# Patient Record
Sex: FEMALE | Race: BLACK OR AFRICAN AMERICAN | NOT HISPANIC OR LATINO | Employment: PART TIME | URBAN - METROPOLITAN AREA
[De-identification: names, ages, dates, MRNs, and addresses within clinical notes are randomized per-mention and may not be internally consistent; named-entity substitution may affect disease eponyms.]

---

## 2023-11-03 ENCOUNTER — APPOINTMENT (OUTPATIENT)
Dept: PRIMARY CARE | Facility: CLINIC | Age: 38
End: 2023-11-03
Payer: COMMERCIAL

## 2023-11-20 DIAGNOSIS — F32.A DEPRESSION, UNSPECIFIED DEPRESSION TYPE: Primary | ICD-10-CM

## 2023-11-20 RX ORDER — FLUOXETINE 20 MG/1
40 TABLET ORAL DAILY
Qty: 180 TABLET | Refills: 0 | Status: SHIPPED | OUTPATIENT
Start: 2023-11-20

## 2023-11-30 ENCOUNTER — LAB (OUTPATIENT)
Dept: LAB | Facility: LAB | Age: 38
End: 2023-11-30
Payer: COMMERCIAL

## 2023-11-30 DIAGNOSIS — Z11.59 ENCOUNTER FOR SCREENING FOR OTHER VIRAL DISEASES: ICD-10-CM

## 2023-11-30 DIAGNOSIS — E78.5 HYPERLIPIDEMIA, UNSPECIFIED: ICD-10-CM

## 2023-11-30 DIAGNOSIS — E11.9 TYPE 2 DIABETES MELLITUS WITHOUT COMPLICATIONS (MULTI): Primary | ICD-10-CM

## 2023-11-30 LAB
CHOLEST SERPL-MCNC: 227 MG/DL (ref 0–199)
CHOLESTEROL/HDL RATIO: 5.9
CREAT UR-MCNC: 357.4 MG/DL (ref 20–320)
EST. AVERAGE GLUCOSE BLD GHB EST-MCNC: 223 MG/DL
HBA1C MFR BLD: 9.4 %
HCV AB SER QL: NONREACTIVE
HDLC SERPL-MCNC: 38.2 MG/DL
LDLC SERPL CALC-MCNC: 126 MG/DL
MICROALBUMIN UR-MCNC: 700.1 MG/L
MICROALBUMIN/CREAT UR: 195.9 UG/MG CREAT
NON HDL CHOLESTEROL: 189 MG/DL (ref 0–149)
TRIGL SERPL-MCNC: 315 MG/DL (ref 0–149)
VLDL: 63 MG/DL (ref 0–40)

## 2023-11-30 PROCEDURE — 82570 ASSAY OF URINE CREATININE: CPT

## 2023-11-30 PROCEDURE — 80061 LIPID PANEL: CPT

## 2023-11-30 PROCEDURE — 36415 COLL VENOUS BLD VENIPUNCTURE: CPT

## 2023-11-30 PROCEDURE — 82043 UR ALBUMIN QUANTITATIVE: CPT

## 2023-11-30 PROCEDURE — 83036 HEMOGLOBIN GLYCOSYLATED A1C: CPT

## 2023-11-30 PROCEDURE — 86803 HEPATITIS C AB TEST: CPT

## 2024-01-26 ENCOUNTER — APPOINTMENT (OUTPATIENT)
Dept: RADIOLOGY | Facility: CLINIC | Age: 39
End: 2024-01-26
Payer: COMMERCIAL

## 2024-02-02 ENCOUNTER — HOSPITAL ENCOUNTER (OUTPATIENT)
Dept: RADIOLOGY | Facility: CLINIC | Age: 39
Discharge: HOME | End: 2024-02-02
Payer: COMMERCIAL

## 2024-02-02 DIAGNOSIS — E11.21 TYPE 2 DIABETES MELLITUS WITH DIABETIC NEPHROPATHY (MULTI): ICD-10-CM

## 2024-02-02 PROCEDURE — 76770 US EXAM ABDO BACK WALL COMP: CPT | Performed by: STUDENT IN AN ORGANIZED HEALTH CARE EDUCATION/TRAINING PROGRAM

## 2024-02-02 PROCEDURE — 76770 US EXAM ABDO BACK WALL COMP: CPT

## 2024-03-05 ENCOUNTER — HOSPITAL ENCOUNTER (OUTPATIENT)
Dept: RADIOLOGY | Facility: CLINIC | Age: 39
End: 2024-03-05
Payer: COMMERCIAL

## 2024-03-08 ENCOUNTER — HOSPITAL ENCOUNTER (OUTPATIENT)
Dept: RADIOLOGY | Facility: CLINIC | Age: 39
Discharge: HOME | End: 2024-03-08
Payer: COMMERCIAL

## 2024-03-08 ENCOUNTER — LAB (OUTPATIENT)
Dept: LAB | Facility: LAB | Age: 39
End: 2024-03-08
Payer: COMMERCIAL

## 2024-03-08 DIAGNOSIS — E04.9 NONTOXIC GOITER, UNSPECIFIED: ICD-10-CM

## 2024-03-08 DIAGNOSIS — R61 GENERALIZED HYPERHIDROSIS: ICD-10-CM

## 2024-03-08 DIAGNOSIS — E04.9 NONTOXIC GOITER, UNSPECIFIED: Primary | ICD-10-CM

## 2024-03-08 LAB
BASOPHILS # BLD AUTO: 0.13 X10*3/UL (ref 0–0.1)
BASOPHILS NFR BLD AUTO: 1 %
CRP SERPL-MCNC: 0.27 MG/DL
EOSINOPHIL # BLD AUTO: 0.41 X10*3/UL (ref 0–0.7)
EOSINOPHIL NFR BLD AUTO: 3.1 %
ERYTHROCYTE [DISTWIDTH] IN BLOOD BY AUTOMATED COUNT: 16.7 % (ref 11.5–14.5)
FSH SERPL-ACNC: 7.3 IU/L
HCT VFR BLD AUTO: 41.2 % (ref 36–46)
HGB BLD-MCNC: 13 G/DL (ref 12–16)
HIV 1+2 AB+HIV1 P24 AG SERPL QL IA: NONREACTIVE
IMM GRANULOCYTES # BLD AUTO: 0.16 X10*3/UL (ref 0–0.7)
IMM GRANULOCYTES NFR BLD AUTO: 1.2 % (ref 0–0.9)
LYMPHOCYTES # BLD AUTO: 2.63 X10*3/UL (ref 1.2–4.8)
LYMPHOCYTES NFR BLD AUTO: 19.9 %
MCH RBC QN AUTO: 22.7 PG (ref 26–34)
MCHC RBC AUTO-ENTMCNC: 31.6 G/DL (ref 32–36)
MCV RBC AUTO: 72 FL (ref 80–100)
MONOCYTES # BLD AUTO: 0.96 X10*3/UL (ref 0.1–1)
MONOCYTES NFR BLD AUTO: 7.3 %
NEUTROPHILS # BLD AUTO: 8.94 X10*3/UL (ref 1.2–7.7)
NEUTROPHILS NFR BLD AUTO: 67.5 %
NRBC BLD-RTO: 0 /100 WBCS (ref 0–0)
PLATELET # BLD AUTO: 340 X10*3/UL (ref 150–450)
RBC # BLD AUTO: 5.72 X10*6/UL (ref 4–5.2)
TSH SERPL-ACNC: 0.88 MIU/L (ref 0.44–3.98)
WBC # BLD AUTO: 13.2 X10*3/UL (ref 4.4–11.3)

## 2024-03-08 PROCEDURE — 71046 X-RAY EXAM CHEST 2 VIEWS: CPT

## 2024-03-08 PROCEDURE — 76536 US EXAM OF HEAD AND NECK: CPT | Performed by: RADIOLOGY

## 2024-03-08 PROCEDURE — 83001 ASSAY OF GONADOTROPIN (FSH): CPT | Performed by: FAMILY MEDICINE

## 2024-03-08 PROCEDURE — 86140 C-REACTIVE PROTEIN: CPT | Performed by: FAMILY MEDICINE

## 2024-03-08 PROCEDURE — 85025 COMPLETE CBC W/AUTO DIFF WBC: CPT | Performed by: FAMILY MEDICINE

## 2024-03-08 PROCEDURE — 76536 US EXAM OF HEAD AND NECK: CPT

## 2024-03-08 PROCEDURE — 86481 TB AG RESPONSE T-CELL SUSP: CPT

## 2024-03-08 PROCEDURE — 87389 HIV-1 AG W/HIV-1&-2 AB AG IA: CPT | Performed by: FAMILY MEDICINE

## 2024-03-08 PROCEDURE — 84443 ASSAY THYROID STIM HORMONE: CPT | Performed by: FAMILY MEDICINE

## 2024-03-08 PROCEDURE — 71046 X-RAY EXAM CHEST 2 VIEWS: CPT | Performed by: RADIOLOGY

## 2024-03-10 LAB
NIL(NEG) CONTROL SPOT COUNT: NORMAL
PANEL A SPOT COUNT: 3
PANEL B SPOT COUNT: 4
POS CONTROL SPOT COUNT: NORMAL
T-SPOT. TB INTERPRETATION: NEGATIVE

## 2024-03-21 ENCOUNTER — OFFICE VISIT (OUTPATIENT)
Dept: NEPHROLOGY | Facility: CLINIC | Age: 39
End: 2024-03-21
Payer: COMMERCIAL

## 2024-03-21 VITALS
RESPIRATION RATE: 16 BRPM | BODY MASS INDEX: 27.84 KG/M2 | HEIGHT: 65 IN | HEART RATE: 67 BPM | WEIGHT: 167.11 LBS | SYSTOLIC BLOOD PRESSURE: 118 MMHG | DIASTOLIC BLOOD PRESSURE: 74 MMHG | OXYGEN SATURATION: 99 %

## 2024-03-21 DIAGNOSIS — I10 BENIGN ESSENTIAL HTN: ICD-10-CM

## 2024-03-21 DIAGNOSIS — N18.1 CHRONIC KIDNEY DISEASE, STAGE I: Primary | ICD-10-CM

## 2024-03-21 PROCEDURE — 3074F SYST BP LT 130 MM HG: CPT | Performed by: INTERNAL MEDICINE

## 2024-03-21 PROCEDURE — 99203 OFFICE O/P NEW LOW 30 MIN: CPT | Performed by: INTERNAL MEDICINE

## 2024-03-21 PROCEDURE — 3078F DIAST BP <80 MM HG: CPT | Performed by: INTERNAL MEDICINE

## 2024-03-21 ASSESSMENT — PAIN SCALES - GENERAL: PAINLEVEL: 0-NO PAIN

## 2024-03-21 NOTE — PROGRESS NOTES
Nephrology Outpatient New Patient Visit Note    Chief Concern:  Felling unwell     History Of Present Illness  Ashley Chu is a 39 y.o. female with a history of  HTN, uncontrolled T2D after pregnancies in 2010 HbA1C >9, non compliance, sinusitis, presenting today because she fells weak, lack of energy and wondering if is related to her kidneys  - Saw a nephrologist at [place], who ordered a kidney US and did not hear anything back.   - 190 ACR, Scr 0.62 from Nov 2023   Normal kidney US   -She is on jardiance, and several BP meds that can't recall name, apparently not on RAASi     Past Medical History  She has a past medical history of Acute upper respiratory infection, unspecified (03/17/2016), Body mass index (BMI) 38.0-38.9, adult, Candidiasis, unspecified (04/25/2017), Cellulitis of right finger (02/05/2020), Diseases of the respiratory system complicating pregnancy, unspecified trimester (10/24/2022), Essential (primary) hypertension (08/21/2019), Ingrowing nail (02/05/2020), Localized edema (06/21/2016), Obesity complicating pregnancy, first trimester (11/28/2017), Other conditions influencing health status (02/08/2019), Personal history of gestational diabetes, Personal history of gestational diabetes (02/08/2019), Personal history of other diseases of the female genital tract (11/11/2015), Personal history of other diseases of the respiratory system (03/11/2016), Personal history of other diseases of the respiratory system (08/21/2019), Personal history of other diseases of urinary system (03/18/2014), Personal history of other specified conditions (11/11/2015), Personal history of other specified conditions (02/18/2014), Supervision of pregnancy with other poor reproductive or obstetric history, first trimester (10/24/2022), Unspecified maternal hypertension, first trimester (11/28/2017), and Urinary tract infection, site not specified (02/18/2014).  There is no problem list on file for this  "patient.      Surgical History  She has a past surgical history that includes Other surgical history (11/23/2020); Other surgical history (11/23/2020); and Other surgical history (11/23/2020).     Social History  She reports that she has been smoking cigarettes. She does not have any smokeless tobacco history on file. No history on file for alcohol use and drug use.    Family History  Family History   Problem Relation Name Age of Onset    Diabetes Mother      Diabetes Brother          Allergies  Patient has no known allergies.    Review of Systems  A 12-point review of systems was performed and noted be negative except for that which was mentioned in the history of present illness     Last Recorded Vitals  /74 (BP Location: Right arm, Patient Position: Sitting, BP Cuff Size: Adult long)   Pulse 67   Resp 16   Ht 1.651 m (5' 5\")   Wt 75.8 kg (167 lb 1.7 oz)   SpO2 99%   BMI 27.81 kg/m²      Physical Exam:  Constitutional:  No acute distress  Respiration:  Breathing comfortably, no stridor  Cardiovascular:  No clubbing/cyanosis/edema in hands  Eyes:  EOM intact, sclera normal  Neuro:  Alert and oriented times 3, Cranial nerves II-XII grossly intact and symmetric bilaterally. No asterixis  Head and Face:  Symmetric facial features, no masses or lesions, sinuses non-tender to palpation  Neck/Lymph:  No LAD, no thyroid masses, trachea midline  Skin:  skin is without visible rash  Psych:  Alert and oriented with appropriate mood and affect      Medications:  Medication Documentation Review Audit    **Prior to Admission medications have not yet been reviewed**         Relevant Results Recent labs reviewed in the EMR.  Lab Results   Component Value Date    HGB 13.0 03/08/2024    HGB 11.2 (L) 12/01/2022    HGB 13.7 05/20/2021    HGB 12.6 03/09/2020    MCV 72 (L) 03/08/2024    MCV 72 (L) 12/01/2022    MCV 74 (L) 05/20/2021    MCV 68 (L) 03/09/2020     03/08/2024     12/01/2022     05/20/2021 " "    03/09/2020       No results found for: \"FERRITIN\", \"IRON\"    Lab Results   Component Value Date     12/01/2022    K 4.2 12/01/2022     12/01/2022    BUN 13 12/01/2022    CREATININE 0.65 12/01/2022         Imaging:  I personally reviewed and this is my impression:        Assessment/Plan   1. Chronic kidney disease, stage I  - Normal Scr, albuminuria due to incipient diabetes damage but this would not explain any of there symptoms. She needs to be started on RAASi if not already on it and keep SGLT2i  - target SBP <120 -already there- and HbA1C <7  - I had a discussion about nature of kidney disease with uncontrolled DM and HTN and need to prevent any damage at this stage of disease  - No need to come here, she can get her care with PCP, come back prn     2. Benign essential HTN  - controlled     Paul Daniels MD  Division of Nephrology and Hypertension      "

## 2024-04-12 ENCOUNTER — LAB (OUTPATIENT)
Dept: LAB | Facility: LAB | Age: 39
End: 2024-04-12
Payer: COMMERCIAL

## 2024-04-12 DIAGNOSIS — D56.3 THALASSEMIA MINOR: ICD-10-CM

## 2024-04-12 DIAGNOSIS — E11.22 TYPE 2 DIABETES MELLITUS WITH DIABETIC CHRONIC KIDNEY DISEASE (MULTI): ICD-10-CM

## 2024-04-12 DIAGNOSIS — Z79.4 LONG TERM (CURRENT) USE OF INSULIN (MULTI): ICD-10-CM

## 2024-04-12 DIAGNOSIS — D72.829 ELEVATED WHITE BLOOD CELL COUNT, UNSPECIFIED: Primary | ICD-10-CM

## 2024-04-12 LAB
ALBUMIN SERPL BCP-MCNC: 4.5 G/DL (ref 3.4–5)
ALP SERPL-CCNC: 71 U/L (ref 33–110)
ALT SERPL W P-5'-P-CCNC: 19 U/L (ref 7–45)
ANION GAP SERPL CALC-SCNC: 11 MMOL/L (ref 10–20)
AST SERPL W P-5'-P-CCNC: 16 U/L (ref 9–39)
BASOPHILS # BLD AUTO: 0.08 X10*3/UL (ref 0–0.1)
BASOPHILS NFR BLD AUTO: 0.7 %
BILIRUB SERPL-MCNC: 0.6 MG/DL (ref 0–1.2)
BUN SERPL-MCNC: 12 MG/DL (ref 6–23)
CALCIUM SERPL-MCNC: 9.3 MG/DL (ref 8.6–10.3)
CHLORIDE SERPL-SCNC: 104 MMOL/L (ref 98–107)
CO2 SERPL-SCNC: 26 MMOL/L (ref 21–32)
CREAT SERPL-MCNC: 0.8 MG/DL (ref 0.5–1.05)
EGFRCR SERPLBLD CKD-EPI 2021: >90 ML/MIN/1.73M*2
EOSINOPHIL # BLD AUTO: 0.24 X10*3/UL (ref 0–0.7)
EOSINOPHIL NFR BLD AUTO: 2.2 %
ERYTHROCYTE [DISTWIDTH] IN BLOOD BY AUTOMATED COUNT: 15.9 % (ref 11.5–14.5)
EST. AVERAGE GLUCOSE BLD GHB EST-MCNC: 203 MG/DL
FERRITIN SERPL-MCNC: 50 NG/ML (ref 8–150)
GLUCOSE SERPL-MCNC: 187 MG/DL (ref 74–99)
HBA1C MFR BLD: 8.7 %
HCT VFR BLD AUTO: 38.5 % (ref 36–46)
HGB BLD-MCNC: 12.1 G/DL (ref 12–16)
IMM GRANULOCYTES # BLD AUTO: 0.05 X10*3/UL (ref 0–0.7)
IMM GRANULOCYTES NFR BLD AUTO: 0.5 % (ref 0–0.9)
IRON SATN MFR SERPL: 17 % (ref 25–45)
IRON SERPL-MCNC: 59 UG/DL (ref 35–150)
LYMPHOCYTES # BLD AUTO: 2.14 X10*3/UL (ref 1.2–4.8)
LYMPHOCYTES NFR BLD AUTO: 19.3 %
MCH RBC QN AUTO: 22.7 PG (ref 26–34)
MCHC RBC AUTO-ENTMCNC: 31.4 G/DL (ref 32–36)
MCV RBC AUTO: 72 FL (ref 80–100)
MONOCYTES # BLD AUTO: 0.82 X10*3/UL (ref 0.1–1)
MONOCYTES NFR BLD AUTO: 7.4 %
NEUTROPHILS # BLD AUTO: 7.73 X10*3/UL (ref 1.2–7.7)
NEUTROPHILS NFR BLD AUTO: 69.9 %
NRBC BLD-RTO: 0 /100 WBCS (ref 0–0)
PLATELET # BLD AUTO: 313 X10*3/UL (ref 150–450)
POTASSIUM SERPL-SCNC: 3.7 MMOL/L (ref 3.5–5.3)
PROT SERPL-MCNC: 7.2 G/DL (ref 6.4–8.2)
RBC # BLD AUTO: 5.32 X10*6/UL (ref 4–5.2)
SODIUM SERPL-SCNC: 137 MMOL/L (ref 136–145)
TIBC SERPL-MCNC: 349 UG/DL (ref 240–445)
UIBC SERPL-MCNC: 290 UG/DL (ref 110–370)
WBC # BLD AUTO: 11.1 X10*3/UL (ref 4.4–11.3)

## 2024-04-12 PROCEDURE — 36415 COLL VENOUS BLD VENIPUNCTURE: CPT

## 2024-04-12 PROCEDURE — 82728 ASSAY OF FERRITIN: CPT

## 2024-04-12 PROCEDURE — 83550 IRON BINDING TEST: CPT

## 2024-04-12 PROCEDURE — 83540 ASSAY OF IRON: CPT

## 2024-04-12 PROCEDURE — 83036 HEMOGLOBIN GLYCOSYLATED A1C: CPT

## 2024-04-12 PROCEDURE — 85025 COMPLETE CBC W/AUTO DIFF WBC: CPT

## 2024-04-12 PROCEDURE — 80053 COMPREHEN METABOLIC PANEL: CPT

## 2024-04-12 PROCEDURE — 85060 BLOOD SMEAR INTERPRETATION: CPT | Performed by: INTERNAL MEDICINE

## 2024-04-15 LAB — PATH REVIEW-CBC DIFFERENTIAL: NORMAL

## 2024-05-31 ENCOUNTER — APPOINTMENT (OUTPATIENT)
Dept: RADIOLOGY | Facility: HOSPITAL | Age: 39
End: 2024-05-31
Payer: COMMERCIAL

## 2024-05-31 ENCOUNTER — HOSPITAL ENCOUNTER (EMERGENCY)
Facility: HOSPITAL | Age: 39
Discharge: HOME | End: 2024-05-31
Attending: EMERGENCY MEDICINE
Payer: COMMERCIAL

## 2024-05-31 VITALS
SYSTOLIC BLOOD PRESSURE: 120 MMHG | HEART RATE: 71 BPM | TEMPERATURE: 98 F | DIASTOLIC BLOOD PRESSURE: 73 MMHG | HEIGHT: 65 IN | RESPIRATION RATE: 18 BRPM | WEIGHT: 137 LBS | BODY MASS INDEX: 22.82 KG/M2 | OXYGEN SATURATION: 98 %

## 2024-05-31 DIAGNOSIS — R10.9 ABDOMINAL PAIN, UNSPECIFIED ABDOMINAL LOCATION: Primary | ICD-10-CM

## 2024-05-31 LAB
ANION GAP SERPL CALC-SCNC: 13 MMOL/L (ref 10–20)
APPEARANCE UR: CLEAR
BASOPHILS # BLD AUTO: 0.11 X10*3/UL (ref 0–0.1)
BASOPHILS NFR BLD AUTO: 0.8 %
BILIRUB UR STRIP.AUTO-MCNC: NEGATIVE MG/DL
BUN SERPL-MCNC: 11 MG/DL (ref 6–23)
CALCIUM SERPL-MCNC: 9.2 MG/DL (ref 8.6–10.3)
CHLORIDE SERPL-SCNC: 103 MMOL/L (ref 98–107)
CO2 SERPL-SCNC: 23 MMOL/L (ref 21–32)
COLOR UR: ABNORMAL
CREAT SERPL-MCNC: 0.86 MG/DL (ref 0.5–1.05)
EGFRCR SERPLBLD CKD-EPI 2021: 89 ML/MIN/1.73M*2
EOSINOPHIL # BLD AUTO: 0.4 X10*3/UL (ref 0–0.7)
EOSINOPHIL NFR BLD AUTO: 2.8 %
ERYTHROCYTE [DISTWIDTH] IN BLOOD BY AUTOMATED COUNT: 17.2 % (ref 11.5–14.5)
GLUCOSE SERPL-MCNC: 267 MG/DL (ref 74–99)
GLUCOSE UR STRIP.AUTO-MCNC: ABNORMAL MG/DL
HCG UR QL IA.RAPID: NEGATIVE
HCT VFR BLD AUTO: 42.5 % (ref 36–46)
HGB BLD-MCNC: 14.1 G/DL (ref 12–16)
IMM GRANULOCYTES # BLD AUTO: 0.12 X10*3/UL (ref 0–0.7)
IMM GRANULOCYTES NFR BLD AUTO: 0.8 % (ref 0–0.9)
KETONES UR STRIP.AUTO-MCNC: NEGATIVE MG/DL
LACTATE SERPL-SCNC: 1.9 MMOL/L (ref 0.4–2)
LEUKOCYTE ESTERASE UR QL STRIP.AUTO: NEGATIVE
LYMPHOCYTES # BLD AUTO: 2.7 X10*3/UL (ref 1.2–4.8)
LYMPHOCYTES NFR BLD AUTO: 18.8 %
MCH RBC QN AUTO: 23.3 PG (ref 26–34)
MCHC RBC AUTO-ENTMCNC: 33.2 G/DL (ref 32–36)
MCV RBC AUTO: 70 FL (ref 80–100)
MONOCYTES # BLD AUTO: 1.06 X10*3/UL (ref 0.1–1)
MONOCYTES NFR BLD AUTO: 7.4 %
MUCOUS THREADS #/AREA URNS AUTO: ABNORMAL /LPF
NEUTROPHILS # BLD AUTO: 9.97 X10*3/UL (ref 1.2–7.7)
NEUTROPHILS NFR BLD AUTO: 69.4 %
NITRITE UR QL STRIP.AUTO: NEGATIVE
NRBC BLD-RTO: 0 /100 WBCS (ref 0–0)
PH UR STRIP.AUTO: 6.5 [PH]
PLATELET # BLD AUTO: 315 X10*3/UL (ref 150–450)
POTASSIUM SERPL-SCNC: 4 MMOL/L (ref 3.5–5.3)
PROT UR STRIP.AUTO-MCNC: NEGATIVE MG/DL
RBC # BLD AUTO: 6.06 X10*6/UL (ref 4–5.2)
RBC # UR STRIP.AUTO: ABNORMAL /UL
RBC #/AREA URNS AUTO: >20 /HPF
SODIUM SERPL-SCNC: 135 MMOL/L (ref 136–145)
SP GR UR STRIP.AUTO: 1.01
SQUAMOUS #/AREA URNS AUTO: ABNORMAL /HPF
UROBILINOGEN UR STRIP.AUTO-MCNC: NORMAL MG/DL
WBC # BLD AUTO: 14.4 X10*3/UL (ref 4.4–11.3)
WBC #/AREA URNS AUTO: ABNORMAL /HPF

## 2024-05-31 PROCEDURE — 74176 CT ABD & PELVIS W/O CONTRAST: CPT

## 2024-05-31 PROCEDURE — 81025 URINE PREGNANCY TEST: CPT | Performed by: NURSE PRACTITIONER

## 2024-05-31 PROCEDURE — 36415 COLL VENOUS BLD VENIPUNCTURE: CPT | Performed by: NURSE PRACTITIONER

## 2024-05-31 PROCEDURE — 96374 THER/PROPH/DIAG INJ IV PUSH: CPT

## 2024-05-31 PROCEDURE — 85025 COMPLETE CBC W/AUTO DIFF WBC: CPT | Performed by: NURSE PRACTITIONER

## 2024-05-31 PROCEDURE — 2500000004 HC RX 250 GENERAL PHARMACY W/ HCPCS (ALT 636 FOR OP/ED): Performed by: NURSE PRACTITIONER

## 2024-05-31 PROCEDURE — 96375 TX/PRO/DX INJ NEW DRUG ADDON: CPT

## 2024-05-31 PROCEDURE — 99284 EMERGENCY DEPT VISIT MOD MDM: CPT | Mod: 25

## 2024-05-31 PROCEDURE — 80048 BASIC METABOLIC PNL TOTAL CA: CPT | Performed by: NURSE PRACTITIONER

## 2024-05-31 PROCEDURE — 71046 X-RAY EXAM CHEST 2 VIEWS: CPT

## 2024-05-31 PROCEDURE — 83605 ASSAY OF LACTIC ACID: CPT | Performed by: NURSE PRACTITIONER

## 2024-05-31 PROCEDURE — 96361 HYDRATE IV INFUSION ADD-ON: CPT

## 2024-05-31 PROCEDURE — 81001 URINALYSIS AUTO W/SCOPE: CPT | Performed by: NURSE PRACTITIONER

## 2024-05-31 PROCEDURE — 74176 CT ABD & PELVIS W/O CONTRAST: CPT | Performed by: RADIOLOGY

## 2024-05-31 RX ORDER — ONDANSETRON HYDROCHLORIDE 2 MG/ML
4 INJECTION, SOLUTION INTRAVENOUS ONCE
Status: COMPLETED | OUTPATIENT
Start: 2024-05-31 | End: 2024-05-31

## 2024-05-31 RX ORDER — DICYCLOMINE HYDROCHLORIDE 10 MG/1
10 CAPSULE ORAL 4 TIMES DAILY
Qty: 17 CAPSULE | Refills: 0 | Status: SHIPPED | OUTPATIENT
Start: 2024-05-31

## 2024-05-31 RX ORDER — KETOROLAC TROMETHAMINE 30 MG/ML
30 INJECTION, SOLUTION INTRAMUSCULAR; INTRAVENOUS ONCE
Status: COMPLETED | OUTPATIENT
Start: 2024-05-31 | End: 2024-05-31

## 2024-05-31 RX ORDER — ONDANSETRON 4 MG/1
4 TABLET, FILM COATED ORAL 3 TIMES DAILY
Qty: 10 TABLET | Refills: 0 | Status: SHIPPED | OUTPATIENT
Start: 2024-05-31

## 2024-05-31 RX ORDER — DICYCLOMINE HYDROCHLORIDE 10 MG/1
10 CAPSULE ORAL 4 TIMES DAILY
Qty: 17 CAPSULE | Refills: 0 | Status: SHIPPED | OUTPATIENT
Start: 2024-05-31 | End: 2024-05-31

## 2024-05-31 RX ORDER — ONDANSETRON 4 MG/1
4 TABLET, FILM COATED ORAL 3 TIMES DAILY
Qty: 10 TABLET | Refills: 0 | Status: SHIPPED | OUTPATIENT
Start: 2024-05-31 | End: 2024-05-31

## 2024-05-31 RX ADMIN — SODIUM CHLORIDE 1000 ML: 9 INJECTION, SOLUTION INTRAVENOUS at 10:12

## 2024-05-31 RX ADMIN — KETOROLAC TROMETHAMINE 30 MG: 30 INJECTION, SOLUTION INTRAMUSCULAR at 10:44

## 2024-05-31 RX ADMIN — ONDANSETRON 4 MG: 2 INJECTION INTRAMUSCULAR; INTRAVENOUS at 10:11

## 2024-05-31 ASSESSMENT — LIFESTYLE VARIABLES
EVER HAD A DRINK FIRST THING IN THE MORNING TO STEADY YOUR NERVES TO GET RID OF A HANGOVER: NO
HAVE YOU EVER FELT YOU SHOULD CUT DOWN ON YOUR DRINKING: NO
EVER FELT BAD OR GUILTY ABOUT YOUR DRINKING: NO
TOTAL SCORE: 0
HAVE PEOPLE ANNOYED YOU BY CRITICIZING YOUR DRINKING: NO

## 2024-05-31 ASSESSMENT — PAIN DESCRIPTION - DESCRIPTORS: DESCRIPTORS: PRESSURE

## 2024-05-31 ASSESSMENT — COLUMBIA-SUICIDE SEVERITY RATING SCALE - C-SSRS
1. IN THE PAST MONTH, HAVE YOU WISHED YOU WERE DEAD OR WISHED YOU COULD GO TO SLEEP AND NOT WAKE UP?: NO
6. HAVE YOU EVER DONE ANYTHING, STARTED TO DO ANYTHING, OR PREPARED TO DO ANYTHING TO END YOUR LIFE?: NO
2. HAVE YOU ACTUALLY HAD ANY THOUGHTS OF KILLING YOURSELF?: NO

## 2024-05-31 ASSESSMENT — PAIN SCALES - GENERAL
PAINLEVEL_OUTOF10: 3
PAINLEVEL_OUTOF10: 5 - MODERATE PAIN
PAINLEVEL_OUTOF10: 9

## 2024-05-31 ASSESSMENT — PAIN - FUNCTIONAL ASSESSMENT
PAIN_FUNCTIONAL_ASSESSMENT: 0-10
PAIN_FUNCTIONAL_ASSESSMENT: 0-10

## 2024-05-31 ASSESSMENT — PAIN DESCRIPTION - LOCATION: LOCATION: ABDOMEN

## 2024-05-31 ASSESSMENT — PAIN DESCRIPTION - PROGRESSION: CLINICAL_PROGRESSION: GRADUALLY WORSENING

## 2024-05-31 ASSESSMENT — PAIN DESCRIPTION - PAIN TYPE: TYPE: ACUTE PAIN

## 2024-05-31 NOTE — ED PROVIDER NOTES
Chief Complaint   Patient presents with   • Abdominal Pain     Ongoing for several weeks, c/o nausea       HPI       39 year old female presents to the Emergency Department today complaining of a 1-2 month history of left sided abdominal pain that she describes as sharp in nature, constant, radiates to his left flank and to her umbilical region, and varies in intensity. Notes that she has had nausea associated with the above. Denies any associated fever, chills, headache, neck pain, chest pain, shortness of breath, vomiting, diarrhea, constipation, hematemesis, hematochezia, melena, or urinary symptoms.       History provided by:  Patient             Patient History   Past Medical History:   Diagnosis Date   • Acute upper respiratory infection, unspecified 03/17/2016    Acute upper respiratory infection   • Body mass index (BMI) 38.0-38.9, adult     BMI 38.0-38.9,adult   • Candidiasis, unspecified 04/25/2017    Yeast infection   • Cellulitis of right finger 02/05/2020    Paronychia of finger of right hand   • Diseases of the respiratory system complicating pregnancy, unspecified trimester ([facility]) 10/24/2022    Asthma affecting pregnancy, antepartum   • Essential (primary) hypertension 08/21/2019    Essential hypertension with goal blood pressure less than 130/80   • Ingrowing nail 02/05/2020    Ingrowing nail with infection   • Localized edema 06/21/2016    Leg edema, left   • Obesity complicating pregnancy, first trimester ([facility]) 11/28/2017    Obesity during pregnancy in first trimester   • Other conditions influencing health status 02/08/2019    History of cough   • Personal history of gestational diabetes     History of gestational diabetes   • Personal history of gestational diabetes 02/08/2019    History of gestational diabetes mellitus (GDM)   • Personal history of other diseases of the female genital tract 11/11/2015    History of amenorrhea   • Personal history of other diseases of the respiratory  system 2016    History of acute pharyngitis   • Personal history of other diseases of the respiratory system 2019    History of acute bacterial sinusitis   • Personal history of other diseases of urinary system 2014    History of hematuria   • Personal history of other specified conditions 2015    History of nausea   • Personal history of other specified conditions 2014    History of flank pain   • Supervision of pregnancy with other poor reproductive or obstetric history, first trimester ([facility]) 10/24/2022    History of gestational diabetes in prior pregnancy, currently pregnant, first trimester   • Unspecified maternal hypertension, first trimester ([facility]) 2017    Hypertension during pregnancy in first trimester   • Urinary tract infection, site not specified 2014    Recurrent UTI     Past Surgical History:   Procedure Laterality Date   • OTHER SURGICAL HISTORY  2020     section   • OTHER SURGICAL HISTORY  2020    Hernia repair   • OTHER SURGICAL HISTORY  2020    Laparoscopic endometrioma fulguration     Family History   Problem Relation Name Age of Onset   • Diabetes Mother     • Diabetes Brother       Social History     Tobacco Use   • Smoking status: Every Day     Types: Cigarettes   • Smokeless tobacco: Never   Vaping Use   • Vaping status: Never Used   Substance Use Topics   • Alcohol use: Yes     Comment: socially   • Drug use: Never           Physical Exam  Constitutional:       Appearance: Normal appearance.   HENT:      Head: Normocephalic.      Right Ear: Tympanic membrane, ear canal and external ear normal.      Left Ear: Tympanic membrane, ear canal and external ear normal.      Nose: Nose normal.      Mouth/Throat:      Mouth: Mucous membranes are moist.      Pharynx: Oropharynx is clear. No oropharyngeal exudate or posterior oropharyngeal erythema.   Eyes:      Conjunctiva/sclera: Conjunctivae normal.      Pupils: Pupils are  equal, round, and reactive to light.   Cardiovascular:      Rate and Rhythm: Normal rate and regular rhythm.      Pulses:           Radial pulses are 3+ on the right side and 3+ on the left side.        Dorsalis pedis pulses are 3+ on the right side and 3+ on the left side.      Heart sounds: Normal heart sounds. No murmur heard.     No friction rub. No gallop.   Pulmonary:      Effort: Pulmonary effort is normal. No respiratory distress.      Breath sounds: Normal breath sounds. No wheezing, rhonchi or rales.   Abdominal:      General: Abdomen is flat. Bowel sounds are normal.      Palpations: Abdomen is soft.      Tenderness: There is no abdominal tenderness. There is no right CVA tenderness, left CVA tenderness, guarding or rebound. Negative signs include Jarvis's sign and McBurney's sign.   Musculoskeletal:         General: No swelling or deformity.      Cervical back: Full passive range of motion without pain.      Right lower leg: No edema.      Left lower leg: No edema.   Lymphadenopathy:      Cervical: No cervical adenopathy.   Skin:     Capillary Refill: Capillary refill takes less than 2 seconds.      Coloration: Skin is not jaundiced.      Findings: No rash.   Neurological:      General: No focal deficit present.      Mental Status: She is alert and oriented to person, place, and time. Mental status is at baseline.      Gait: Gait is intact.   Psychiatric:         Mood and Affect: Mood normal.         Behavior: Behavior is cooperative.         Labs Reviewed   CBC WITH AUTO DIFFERENTIAL - Abnormal       Result Value    WBC 14.4 (*)     nRBC 0.0      RBC 6.06 (*)     Hemoglobin 14.1      Hematocrit 42.5      MCV 70 (*)     MCH 23.3 (*)     MCHC 33.2      RDW 17.2 (*)     Platelets 315      Neutrophils % 69.4      Immature Granulocytes %, Automated 0.8      Lymphocytes % 18.8      Monocytes % 7.4      Eosinophils % 2.8      Basophils % 0.8      Neutrophils Absolute 9.97 (*)     Immature Granulocytes  Absolute, Automated 0.12      Lymphocytes Absolute 2.70      Monocytes Absolute 1.06 (*)     Eosinophils Absolute 0.40      Basophils Absolute 0.11 (*)    BASIC METABOLIC PANEL - Abnormal    Glucose 267 (*)     Sodium 135 (*)     Potassium 4.0      Chloride 103      Bicarbonate 23      Anion Gap 13      Urea Nitrogen 11      Creatinine 0.86      eGFR 89      Calcium 9.2     URINALYSIS WITH REFLEX CULTURE AND MICROSCOPIC - Abnormal    Color, Urine Light-Yellow      Appearance, Urine Clear      Specific Gravity, Urine 1.010      pH, Urine 6.5      Protein, Urine NEGATIVE      Glucose, Urine OVER (4+) (*)     Blood, Urine 0.5 (2+) (*)     Ketones, Urine NEGATIVE      Bilirubin, Urine NEGATIVE      Urobilinogen, Urine Normal      Nitrite, Urine NEGATIVE      Leukocyte Esterase, Urine NEGATIVE     URINALYSIS MICROSCOPIC WITH REFLEX CULTURE - Abnormal    WBC, Urine 1-5      RBC, Urine >20 (*)     Squamous Epithelial Cells, Urine 1-9 (SPARSE)      Mucus, Urine FEW     LACTATE - Normal    Lactate 1.9      Narrative:     Venipuncture immediately after or during the administration of Metamizole may lead to falsely low results. Testing should be performed immediately  prior to Metamizole dosing.   HCG, URINE, QUALITATIVE - Normal    HCG, Urine NEGATIVE     URINALYSIS WITH REFLEX CULTURE AND MICROSCOPIC    Narrative:     The following orders were created for panel order Urinalysis with Reflex Culture and Microscopic.  Procedure                               Abnormality         Status                     ---------                               -----------         ------                     Urinalysis with Reflex C...[[id number]]  Abnormal            Final result               Extra Urine Gray Tube[[id number]]                                                         Please view results for these tests on the individual orders.   EXTRA URINE GRAY TUBE       CT abdomen pelvis wo IV contrast   Final Result   1. No obstructive uropathy.  No nephrolithiasis demonstrated        2. Enlarged adrenal glands bilaterally as seen on remote imaging   suggesting component of adrenal hyperplasia. Correlate with patient's   laboratory values        3. Uncomplicated scattered descending and sigmoid diverticula.        4. Component of constipation demonstrated with moderate stool filled   colon throughout.             MACRO:   None        Signed by: Bri Jessica 5/31/2024 11:35 AM   Dictation workstation:   [id number]      XR chest 2 views   Final Result   1.  No evidence of acute cardiopulmonary process.                  MACRO:   None        Signed by: Myra Goss 5/31/2024 10:53 AM   Dictation workstation:   [id number]               ED Course & MDM   Diagnoses as of 05/31/24 1326   Abdominal pain, unspecified abdominal location           Medical Decision Making  Patient was seen and evaluated by Dr. Wilde. Saline lock was established with labs drawn and results as above. Given 1 Liter of normal saline wide open over 1 hour. Given Zofran and Toradol as well. After receiving the medications and IV fluids, reported feeling improved. Noted to have an elevated WBC count. Remainder of blood counts, electrolytes, lactate, and kidney function were unremarkable. Urine pregnancy was negative. Urinalysis shows no signs of infection. CT scan of her abdomen and pelvis shows no acute pathology. Repeat abdominal evaluation reveals an abdomen soft, nondistended, and nontender to palpation. There was no rebound, rigidity, or guarding noted. There were no peritoneal signs noted. Continued to have multiple benign serial abdominal exams. At this time, we find no underlying evidence of acute pancreatitis, cholecystitis, cholangitis, diverticulitis, or appendicitis.  Given prescriptions for Zofran and Bentyl. Follow up with their doctor in 3 days. Return if worse in any way. Discharged in stable condition with computer instructions.    Diagnostic Impression:     1. Acute  left sided abdominal pain    2. IV meds and fluids in ED     3. Prescription therapy            Your medication list        ASK your doctor about these medications        Instructions Last Dose Given Next Dose Due   FLUoxetine 20 mg tablet  Commonly known as: PROzac      TAKE TWO TABLETS BY MOUTH DAILY                  Procedure  Procedures     Tico Calero, MOUNA-CNP  05/31/24 7240

## 2024-05-31 NOTE — ED TRIAGE NOTES
Pt c/o abdominal pain that has been ongoing for several weeks, describes it has bilateral flannk that wraps around, denies diarrhea, but endorse nausea

## 2024-06-20 ENCOUNTER — TELEPHONE (OUTPATIENT)
Dept: ENDOCRINOLOGY | Facility: CLINIC | Age: 39
End: 2024-06-20

## 2024-06-20 NOTE — TELEPHONE ENCOUNTER
Patient last seen 12/05/2022. She is scheduled to be seen 7/23/24 and asks that labs be ordered prior to appointment. Last A1C collected 4/12/24 8.7%

## 2024-06-21 ENCOUNTER — LAB (OUTPATIENT)
Dept: LAB | Facility: LAB | Age: 39
End: 2024-06-21
Payer: COMMERCIAL

## 2024-06-21 DIAGNOSIS — Z11.59 ENCOUNTER FOR SCREENING FOR OTHER VIRAL DISEASES: Primary | ICD-10-CM

## 2024-06-21 DIAGNOSIS — E11.9 TYPE 2 DIABETES MELLITUS WITHOUT COMPLICATIONS (MULTI): ICD-10-CM

## 2024-06-21 DIAGNOSIS — E78.5 HYPERLIPIDEMIA, UNSPECIFIED: ICD-10-CM

## 2024-06-21 DIAGNOSIS — E04.9 NONTOXIC GOITER, UNSPECIFIED: ICD-10-CM

## 2024-06-21 LAB
ALBUMIN SERPL BCP-MCNC: 4.4 G/DL (ref 3.4–5)
ALP SERPL-CCNC: 78 U/L (ref 33–110)
ALT SERPL W P-5'-P-CCNC: 14 U/L (ref 7–45)
ANION GAP SERPL CALC-SCNC: 10 MMOL/L (ref 10–20)
AST SERPL W P-5'-P-CCNC: 10 U/L (ref 9–39)
BASOPHILS # BLD AUTO: 0.11 X10*3/UL (ref 0–0.1)
BASOPHILS NFR BLD AUTO: 0.8 %
BILIRUB SERPL-MCNC: 0.5 MG/DL (ref 0–1.2)
BUN SERPL-MCNC: 14 MG/DL (ref 6–23)
CALCIUM SERPL-MCNC: 9.1 MG/DL (ref 8.6–10.3)
CHLORIDE SERPL-SCNC: 108 MMOL/L (ref 98–107)
CHOLEST SERPL-MCNC: 162 MG/DL (ref 0–199)
CHOLESTEROL/HDL RATIO: 3.8
CO2 SERPL-SCNC: 21 MMOL/L (ref 21–32)
CREAT SERPL-MCNC: 0.76 MG/DL (ref 0.5–1.05)
CREAT UR-MCNC: 83.8 MG/DL (ref 20–320)
CRP SERPL-MCNC: 0.25 MG/DL
EGFRCR SERPLBLD CKD-EPI 2021: >90 ML/MIN/1.73M*2
EOSINOPHIL # BLD AUTO: 0.29 X10*3/UL (ref 0–0.7)
EOSINOPHIL NFR BLD AUTO: 2 %
ERYTHROCYTE [DISTWIDTH] IN BLOOD BY AUTOMATED COUNT: 17.8 % (ref 11.5–14.5)
EST. AVERAGE GLUCOSE BLD GHB EST-MCNC: 203 MG/DL
FSH SERPL-ACNC: 4.3 IU/L
GLUCOSE SERPL-MCNC: 173 MG/DL (ref 74–99)
HBA1C MFR BLD: 8.7 %
HCT VFR BLD AUTO: 43.4 % (ref 36–46)
HCV AB SER QL: NONREACTIVE
HDLC SERPL-MCNC: 42.2 MG/DL
HGB BLD-MCNC: 13.8 G/DL (ref 12–16)
HIV 1+2 AB+HIV1 P24 AG SERPL QL IA: NONREACTIVE
IMM GRANULOCYTES # BLD AUTO: 0.15 X10*3/UL (ref 0–0.7)
IMM GRANULOCYTES NFR BLD AUTO: 1.1 % (ref 0–0.9)
LDLC SERPL CALC-MCNC: 102 MG/DL
LYMPHOCYTES # BLD AUTO: 2.96 X10*3/UL (ref 1.2–4.8)
LYMPHOCYTES NFR BLD AUTO: 20.9 %
MCH RBC QN AUTO: 22.8 PG (ref 26–34)
MCHC RBC AUTO-ENTMCNC: 31.8 G/DL (ref 32–36)
MCV RBC AUTO: 72 FL (ref 80–100)
MICROALBUMIN UR-MCNC: 54.3 MG/L
MICROALBUMIN/CREAT UR: 64.8 UG/MG CREAT
MONOCYTES # BLD AUTO: 1.05 X10*3/UL (ref 0.1–1)
MONOCYTES NFR BLD AUTO: 7.4 %
NEUTROPHILS # BLD AUTO: 9.61 X10*3/UL (ref 1.2–7.7)
NEUTROPHILS NFR BLD AUTO: 67.8 %
NON HDL CHOLESTEROL: 120 MG/DL (ref 0–149)
NRBC BLD-RTO: 0 /100 WBCS (ref 0–0)
PLATELET # BLD AUTO: 294 X10*3/UL (ref 150–450)
POTASSIUM SERPL-SCNC: 4.1 MMOL/L (ref 3.5–5.3)
PROT SERPL-MCNC: 6.6 G/DL (ref 6.4–8.2)
RBC # BLD AUTO: 6.06 X10*6/UL (ref 4–5.2)
SODIUM SERPL-SCNC: 135 MMOL/L (ref 136–145)
TRIGL SERPL-MCNC: 90 MG/DL (ref 0–149)
TSH SERPL-ACNC: 0.93 MIU/L (ref 0.44–3.98)
VLDL: 18 MG/DL (ref 0–40)
WBC # BLD AUTO: 14.2 X10*3/UL (ref 4.4–11.3)

## 2024-06-21 PROCEDURE — 86140 C-REACTIVE PROTEIN: CPT

## 2024-06-21 PROCEDURE — 87389 HIV-1 AG W/HIV-1&-2 AB AG IA: CPT

## 2024-06-21 PROCEDURE — 83001 ASSAY OF GONADOTROPIN (FSH): CPT

## 2024-06-21 PROCEDURE — 80061 LIPID PANEL: CPT

## 2024-06-21 PROCEDURE — 85025 COMPLETE CBC W/AUTO DIFF WBC: CPT

## 2024-06-21 PROCEDURE — 82043 UR ALBUMIN QUANTITATIVE: CPT

## 2024-06-21 PROCEDURE — 87522 HEPATITIS C REVRS TRNSCRPJ: CPT

## 2024-06-21 PROCEDURE — 86481 TB AG RESPONSE T-CELL SUSP: CPT

## 2024-06-21 PROCEDURE — 82570 ASSAY OF URINE CREATININE: CPT

## 2024-06-21 PROCEDURE — 84443 ASSAY THYROID STIM HORMONE: CPT

## 2024-06-21 PROCEDURE — 86803 HEPATITIS C AB TEST: CPT

## 2024-06-21 PROCEDURE — 80053 COMPREHEN METABOLIC PANEL: CPT

## 2024-06-21 PROCEDURE — 83036 HEMOGLOBIN GLYCOSYLATED A1C: CPT

## 2024-06-21 PROCEDURE — 36415 COLL VENOUS BLD VENIPUNCTURE: CPT

## 2024-06-22 LAB
HCV RNA SERPL NAA+PROBE-ACNC: NOT DETECTED K[IU]/ML
HCV RNA SERPL NAA+PROBE-LOG IU: NORMAL {LOG_IU}/ML

## 2024-06-23 LAB
NIL(NEG) CONTROL SPOT COUNT: NORMAL
PANEL A SPOT COUNT: 0
PANEL B SPOT COUNT: 0
POS CONTROL SPOT COUNT: NORMAL
T-SPOT. TB INTERPRETATION: NEGATIVE

## 2024-07-23 ENCOUNTER — APPOINTMENT (OUTPATIENT)
Dept: ENDOCRINOLOGY | Facility: CLINIC | Age: 39
End: 2024-07-23
Payer: COMMERCIAL

## 2024-07-23 VITALS
SYSTOLIC BLOOD PRESSURE: 118 MMHG | DIASTOLIC BLOOD PRESSURE: 72 MMHG | BODY MASS INDEX: 27.99 KG/M2 | HEIGHT: 65 IN | HEART RATE: 83 BPM | WEIGHT: 168 LBS

## 2024-07-23 DIAGNOSIS — Z79.4 TYPE 2 DIABETES MELLITUS WITH OTHER SPECIFIED COMPLICATION, WITH LONG-TERM CURRENT USE OF INSULIN (MULTI): ICD-10-CM

## 2024-07-23 DIAGNOSIS — E11.69 TYPE 2 DIABETES MELLITUS WITH OTHER SPECIFIED COMPLICATION, WITH LONG-TERM CURRENT USE OF INSULIN (MULTI): ICD-10-CM

## 2024-07-23 PROCEDURE — 3008F BODY MASS INDEX DOCD: CPT | Performed by: INTERNAL MEDICINE

## 2024-07-23 PROCEDURE — 95251 CONT GLUC MNTR ANALYSIS I&R: CPT | Performed by: INTERNAL MEDICINE

## 2024-07-23 PROCEDURE — 3060F POS MICROALBUMINURIA REV: CPT | Performed by: INTERNAL MEDICINE

## 2024-07-23 PROCEDURE — 3049F LDL-C 100-129 MG/DL: CPT | Performed by: INTERNAL MEDICINE

## 2024-07-23 PROCEDURE — 3078F DIAST BP <80 MM HG: CPT | Performed by: INTERNAL MEDICINE

## 2024-07-23 PROCEDURE — 3052F HG A1C>EQUAL 8.0%<EQUAL 9.0%: CPT | Performed by: INTERNAL MEDICINE

## 2024-07-23 PROCEDURE — 99214 OFFICE O/P EST MOD 30 MIN: CPT | Performed by: INTERNAL MEDICINE

## 2024-07-23 PROCEDURE — 4010F ACE/ARB THERAPY RXD/TAKEN: CPT | Performed by: INTERNAL MEDICINE

## 2024-07-23 PROCEDURE — 3074F SYST BP LT 130 MM HG: CPT | Performed by: INTERNAL MEDICINE

## 2024-07-23 RX ORDER — TOPIRAMATE 50 MG/1
50 TABLET, FILM COATED ORAL DAILY
COMMUNITY
Start: 2024-05-13

## 2024-07-23 RX ORDER — LOSARTAN POTASSIUM 50 MG/1
1 TABLET ORAL
COMMUNITY
Start: 2024-05-13

## 2024-07-23 RX ORDER — ARIPIPRAZOLE 2 MG/1
2 TABLET ORAL NIGHTLY
COMMUNITY

## 2024-07-23 RX ORDER — EMPAGLIFLOZIN 25 MG/1
25 TABLET, FILM COATED ORAL DAILY
COMMUNITY
End: 2024-07-23 | Stop reason: SINTOL

## 2024-07-23 RX ORDER — MELATONIN 5 MG
10 CAPSULE ORAL NIGHTLY PRN
COMMUNITY
Start: 2024-07-03

## 2024-07-23 RX ORDER — INSULIN GLARGINE 100 [IU]/ML
15 INJECTION, SOLUTION SUBCUTANEOUS NIGHTLY
COMMUNITY
Start: 2021-05-21

## 2024-07-23 RX ORDER — DOCUSATE SODIUM 100 MG/1
100 CAPSULE, LIQUID FILLED ORAL DAILY
COMMUNITY

## 2024-07-23 RX ORDER — LABETALOL 200 MG/1
1 TABLET, FILM COATED ORAL EVERY 12 HOURS
COMMUNITY

## 2024-07-23 RX ORDER — SERTRALINE HYDROCHLORIDE 100 MG/1
100 TABLET, FILM COATED ORAL DAILY
COMMUNITY

## 2024-07-23 RX ORDER — ATORVASTATIN CALCIUM 20 MG/1
20 TABLET, FILM COATED ORAL DAILY
COMMUNITY
Start: 2024-02-16

## 2024-07-23 RX ORDER — GLUCAGON INJECTION, SOLUTION 1 MG/.2ML
1 INJECTION, SOLUTION SUBCUTANEOUS AS NEEDED
COMMUNITY

## 2024-07-23 RX ORDER — DULAGLUTIDE 0.75 MG/.5ML
0.75 INJECTION, SOLUTION SUBCUTANEOUS
COMMUNITY
Start: 2024-07-16

## 2024-07-23 RX ORDER — AMLODIPINE BESYLATE 5 MG/1
1 TABLET ORAL EVERY 12 HOURS
COMMUNITY

## 2024-07-23 RX ORDER — SENNOSIDES 8.6 MG
2 TABLET ORAL AS NEEDED
COMMUNITY
Start: 2024-06-20

## 2024-07-23 RX ORDER — FLASH GLUCOSE SCANNING READER
EACH MISCELLANEOUS
COMMUNITY

## 2024-07-23 RX ORDER — NORETHINDRONE 0.35 MG/1
1 TABLET ORAL DAILY
COMMUNITY
End: 2024-07-25 | Stop reason: ALTCHOICE

## 2024-07-23 RX ORDER — FLASH GLUCOSE SENSOR
KIT MISCELLANEOUS
COMMUNITY

## 2024-07-23 ASSESSMENT — ENCOUNTER SYMPTOMS
DIZZINESS: 1
ABDOMINAL PAIN: 1
CONSTIPATION: 1
NECK PAIN: 1
NUMBNESS: 1
MYALGIAS: 1
CONFUSION: 1
BACK PAIN: 1
WEAKNESS: 1
COUGH: 1
ARTHRALGIAS: 1
NAUSEA: 1
WHEEZING: 1
DIAPHORESIS: 1
TREMORS: 1
FATIGUE: 1
NERVOUS/ANXIOUS: 1

## 2024-07-23 NOTE — PATIENT INSTRUCTIONS
Thank you for choosing [facility]  for your health care needs.  If you have any questions, concerns or medical needs, please feel free to contact our office at [phone].    Please ensure you complete your blood work one week before the next scheduled appointment.    To continue Lantus 15 units SQ daily if glucose values rise above 150mg/dL    To discontinue Jardiance 25mg once daily given vaginal yeast infections   To increase Trulicity to 1.5mg subcutaneous once weekly   For follow up in 3 months

## 2024-07-23 NOTE — PROGRESS NOTES
Subjective   Ashley Chu is a 39 y.o. female who presents for follow-up of Type 2 diabetes mellitus. The initial diagnosis of diabetes was made in May 2021 .  She was last seen in December 2022.    She has been having tingling and numbness to right hand     She has not been using Lantus due to CGM values.    Her maximum weight was over 200 lbs.      She can have vaginal itching and yeast infections.      Known complications due to diabetes included peripheral neuropathy    Cardiovascular risk factors include diabetes mellitus. The patient is on an ACE inhibitor or angiotensin II receptor blocker.  The patient has not been previously hospitalized due to diabetic ketoacidosis.     Current symptoms/problems include paresthesia of the feet. Her clinical course has been stable    Current diabetes regimen is as follows:   Lantus 15 units subcutaneous every morning   Jardiance 25mg once dailiy   Trulicity 0.75 subcutaneous once weekly     The patient is currently checking the blood glucose multiple times per day.    Patient is using: continuous glucose monitor                                                        Hypoglycemia frequency: 0%  Hypoglycemia awareness: Yes     She works at [employer].  She can eat one meal per day     Review of Systems   Constitutional:  Positive for diaphoresis (Night sweats) and fatigue.   Eyes:  Positive for visual disturbance.        Dry eye    Respiratory:  Positive for cough and wheezing.    Gastrointestinal:  Positive for abdominal pain, constipation and nausea.   Musculoskeletal:  Positive for arthralgias, back pain, myalgias and neck pain.   Neurological:  Positive for dizziness, tremors, weakness and numbness.        Tingling    Psychiatric/Behavioral:  Positive for confusion. The patient is nervous/anxious.         Depression  Panic attacks  Memory loss   All other systems reviewed and are negative.      Objective   /72 (BP Location: Left arm, Patient Position: Sitting,  "BP Cuff Size: Adult)   Pulse 83   Ht 1.651 m (5' 5\")   Wt 76.2 kg (168 lb)   BMI 27.96 kg/m²   Physical Exam  Vitals and nursing note reviewed.   Constitutional:       General: She is not in acute distress.     Appearance: Normal appearance. She is normal weight.   HENT:      Head: Normocephalic and atraumatic.      Nose: Nose normal.      Mouth/Throat:      Mouth: Mucous membranes are moist.   Eyes:      Extraocular Movements: Extraocular movements intact.   Cardiovascular:      Rate and Rhythm: Normal rate and regular rhythm.   Pulmonary:      Effort: Pulmonary effort is normal.      Breath sounds: Wheezing present.   Abdominal:      Hernia: A hernia (Palpated when coughing) is present.   Musculoskeletal:         General: Normal range of motion.      Right foot: Normal range of motion. No deformity, bunion or Charcot foot.      Left foot: Normal range of motion. No deformity, bunion or Charcot foot.   Feet:      Right foot:      Skin integrity: Skin integrity normal. No ulcer, blister, skin breakdown or erythema.      Toenail Condition: Right toenails are normal.      Left foot:      Skin integrity: Skin integrity normal. No ulcer, blister, skin breakdown or erythema.      Toenail Condition: Left toenails are normal.   Skin:     General: Skin is warm.   Neurological:      Mental Status: She is alert and oriented to person, place, and time.   Psychiatric:         Mood and Affect: Mood normal.         Lab Review  Glucose (mg/dL)   Date Value   06/21/2024 173 (H)   05/31/2024 267 (H)   04/12/2024 187 (H)     Hemoglobin A1C (%)   Date Value   06/21/2024 8.7 (H)   04/12/2024 8.7 (H)   11/30/2023 9.4 (H)     Bicarbonate (mmol/L)   Date Value   06/21/2024 21   05/31/2024 23   04/12/2024 26     Urea Nitrogen (mg/dL)   Date Value   06/21/2024 14   05/31/2024 11   04/12/2024 12     Creatinine (mg/dL)   Date Value   06/21/2024 0.76   05/31/2024 0.86   04/12/2024 0.80     Lab Results   Component Value Date    CHOL 162 " 06/21/2024    CHOL 227 (H) 11/30/2023    CHOL 160 12/01/2022     Lab Results   Component Value Date    HDL 42.2 06/21/2024    HDL 38.2 11/30/2023    HDL 40.8 12/01/2022     Lab Results   Component Value Date    LDLCALC 102 (H) 06/21/2024    LDLCALC 126 (H) 11/30/2023     Lab Results   Component Value Date    TRIG 90 06/21/2024    TRIG 315 (H) 11/30/2023    TRIG 121 12/01/2022       Health Maintenance:   Foot Exam: July 23, 2024  Eye Exam:  Urine Albumin: June 21, 2024    CGM Interpretation/Plan   14 day CGM download was reviewed in detail as documented above and will be attached to chart.  A minimum of 72 hours of glucose data was used to inform the management plan outlined below.  98 % of values is within target range.    Assessment/Plan   39-year-old female presents for follow up for type 2 diabetes. She had gestational diabetes on 3 occasions. Her blood pressure is at goal.     Type 2 diabetes mellitus, with long-term current use of insulin (Multi)  To continue Lantus 15 units SQ daily if glucose values rise above 150mg/dL    To discontinue Jardiance 25mg once daily given vaginal yeast infections   To increase Trulicity to 1.5mg subcutaneous once weekly   Counseled that the goal A1C should be 7% or less  Counseled glycemic control is warranted to prevent microvascular complications  To continue the use of your CGM for the intensive glucose monitoring due to the dynamic nature of insulin requirements, the narrow therapeutic index of insulin and the potentially fatal consequences of treatment  Counseled that the time in range should be >70%  Please rotate insulin injection sites  For follow up in 3 months

## 2024-07-25 ENCOUNTER — APPOINTMENT (OUTPATIENT)
Dept: OBSTETRICS AND GYNECOLOGY | Facility: CLINIC | Age: 39
End: 2024-07-25
Payer: COMMERCIAL

## 2024-07-25 VITALS
SYSTOLIC BLOOD PRESSURE: 142 MMHG | HEIGHT: 65 IN | WEIGHT: 168 LBS | DIASTOLIC BLOOD PRESSURE: 76 MMHG | BODY MASS INDEX: 27.99 KG/M2

## 2024-07-25 DIAGNOSIS — Z01.411 ENCNTR FOR GYN EXAM (GENERAL) (ROUTINE) W ABNORMAL FINDINGS: ICD-10-CM

## 2024-07-25 DIAGNOSIS — Z32.02 PREGNANCY TEST NEGATIVE: ICD-10-CM

## 2024-07-25 DIAGNOSIS — Z30.430 ENCOUNTER FOR IUD INSERTION: ICD-10-CM

## 2024-07-25 DIAGNOSIS — R10.2 PELVIC PAIN: ICD-10-CM

## 2024-07-25 DIAGNOSIS — N89.8 VAGINAL DISCHARGE: ICD-10-CM

## 2024-07-25 DIAGNOSIS — Z11.3 SCREENING FOR STD (SEXUALLY TRANSMITTED DISEASE): ICD-10-CM

## 2024-07-25 LAB — PREGNANCY TEST URINE, POC: NEGATIVE

## 2024-07-25 PROCEDURE — 58300 INSERT INTRAUTERINE DEVICE: CPT | Performed by: NURSE PRACTITIONER

## 2024-07-25 PROCEDURE — 99395 PREV VISIT EST AGE 18-39: CPT | Performed by: NURSE PRACTITIONER

## 2024-07-25 PROCEDURE — 81025 URINE PREGNANCY TEST: CPT | Performed by: NURSE PRACTITIONER

## 2024-07-25 PROCEDURE — 87070 CULTURE OTHR SPECIMN AEROBIC: CPT

## 2024-07-25 PROCEDURE — 3008F BODY MASS INDEX DOCD: CPT | Performed by: NURSE PRACTITIONER

## 2024-07-25 PROCEDURE — 87661 TRICHOMONAS VAGINALIS AMPLIF: CPT

## 2024-07-25 PROCEDURE — 99203 OFFICE O/P NEW LOW 30 MIN: CPT | Performed by: NURSE PRACTITIONER

## 2024-07-25 PROCEDURE — 87491 CHLMYD TRACH DNA AMP PROBE: CPT

## 2024-07-25 PROCEDURE — 87591 N.GONORRHOEAE DNA AMP PROB: CPT

## 2024-07-25 ASSESSMENT — ENCOUNTER SYMPTOMS
PSYCHIATRIC NEGATIVE: 1
CARDIOVASCULAR NEGATIVE: 1
CONSTITUTIONAL NEGATIVE: 1
MUSCULOSKELETAL NEGATIVE: 1
NEUROLOGICAL NEGATIVE: 1
GASTROINTESTINAL NEGATIVE: 1
EYES NEGATIVE: 1
ENDOCRINE NEGATIVE: 1
RESPIRATORY NEGATIVE: 1

## 2024-07-25 NOTE — PROGRESS NOTES
Subjective   Patient ID: Ashley Chu is a 39 y.o. female who presents for New Patient Visit (Patient complains of pelvic pain, vaginal itching and white discharge for the last 4 months. /Normal PAP / HPV 7/8/2022 at [facility]).  39 year old here for annual exam with complaints of having ongoing pelvic pain, heavier periods and vaginal discharge/itching.   She is due for her pap in 2025 as her last pap was normal with [facility] in 2022.  She desires std testing to be on the safe side.  She notes that for the last 4 months she has had ongoing vaginal discharge and itching.  She notes her tissue became so thin they started her on estradiol cream.  She also has used yeast ointment and pills to help clear ongoing yeast infections.  She was on Jardiance to help with her diabetes but due to the ongoing vaginal symptoms she stopped it 2 days ago.  She also has a history of endometriosis.  She had laparoscopic surgery to remove tissue several years ago and notes she was unaware it would return.  Over the last few years since her youngest child was born (2018) she has noticed her pain and bleeding worsen.  She has tried different birth controls in hopes to regulate or improve the pain.  She is using progesterone only due to blood pressure.  She tried the arm implant, nexplanon, which caused her to have ongoing daily spotting.  She then changes to the progesterone only pill which has helped the bleeding but has not helped the pain.  She notes the pain is an ongoing cramp and sharp pain.  She also has a hernia but this pain is now wrapping around her lower back which is more similar to her endometriosis pain than her hernia pain. She is interested in a different birth control that may help better with the pain.         Review of Systems   Constitutional: Negative.    HENT: Negative.     Eyes: Negative.    Respiratory: Negative.     Cardiovascular: Negative.    Gastrointestinal: Negative.    Endocrine:  Negative.    Genitourinary:  Positive for menstrual problem, pelvic pain and vaginal discharge.   Musculoskeletal: Negative.    Skin: Negative.    Neurological: Negative.    Psychiatric/Behavioral: Negative.         Objective   Physical Exam  Vitals reviewed.   Constitutional:       Appearance: Normal appearance. She is well-developed.   Pulmonary:      Effort: Pulmonary effort is normal. No respiratory distress.   Chest:   Breasts:     Breasts are symmetrical.      Right: Normal. No swelling, bleeding, inverted nipple, mass, nipple discharge, skin change or tenderness.      Left: Normal. No swelling, bleeding, inverted nipple, mass, nipple discharge, skin change or tenderness.   Abdominal:      Palpations: Abdomen is soft.   Genitourinary:     General: Normal vulva.      Exam position: Lithotomy position.      Pubic Area: No rash.       Labia:         Right: No rash, tenderness, lesion or injury.         Left: No rash, tenderness, lesion or injury.       Urethra: No prolapse, urethral pain, urethral swelling or urethral lesion.      Vagina: Vaginal discharge present.      Cervix: Normal.      Uterus: Normal. Tender.       Adnexa: Right adnexa normal and left adnexa normal.      Rectum: Normal.   Musculoskeletal:         General: Normal range of motion.   Lymphadenopathy:      Upper Body:      Right upper body: No supraclavicular, axillary or pectoral adenopathy.      Left upper body: No supraclavicular, axillary or pectoral adenopathy.   Skin:     General: Skin is warm and dry.   Neurological:      General: No focal deficit present.      Mental Status: She is alert and oriented to person, place, and time. Mental status is at baseline.   Psychiatric:         Attention and Perception: Attention and perception normal.         Mood and Affect: Mood and affect normal.         Speech: Speech normal.         Behavior: Behavior normal. Behavior is cooperative.         Thought Content: Thought content normal.          Judgment: Judgment normal.         Assessment/Plan   Problem List Items Addressed This Visit             ICD-10-CM    Encntr for gyn exam (general) (routine) w abnormal findings - Primary Z01.411    Vaginal discharge N89.8    Relevant Orders    Genital Culture    Pelvic pain R10.2    Encounter for IUD insertion Z30.430     Other Visit Diagnoses         Codes    Pregnancy test negative     Z32.02    Relevant Orders    POCT pregnancy, urine manually resulted (Completed)    Screening for STD (sexually transmitted disease)     Z11.3    Relevant Orders    Neisseria gonorrhoeae, Amplified    Chlamydia Trachomatis, Amplified    Trichomonas vaginalis, Nucleic Acid Detection        ANNUAL:  Pap/hpv sent  Gc/chlamydia/trich sent on pap    VAGINAL DISCHARGE:  Genital culture sent, will treat pending reuslts if needed    PELVIC PAIN:  Mirena IUD inserted    Follow up 1 month for IUD check, 1 year for annual examPatient ID: Ashley Chu is a 39 y.o. female.    IUD Insertion    Performed by: LIDA Bennett  Authorized by: LIDA Bennett    Procedure: IUD insertion    Consent obtained by patient, parent, or legal power of  - including discussion of procedure risks and benefits, patient questions answered, and patient education provided: yes    Pregnancy risk: reasonably certain the patient is not pregnant    Date/Time of Insertion:  7/25/2024 12:35 PM  Immediately prior to procedure a time out was called: yes    Pelvic exam performed: yes    Speculum placed in vagina: yes    Cervix cleaned and prepped: yes    Tenaculum/Allis/Ring Forceps applied to cervix: yes    Anesthesia used: no    Uterus sound depth (cm):  8  Cervix manually dilated: no    IUD inserted without complications: yes    OSM: 52 mg levonorgestrel 21 mcg/24 hr (8 yrs) 52 mg  Strings trimmed to (cm):  2  Patient tolerated procedure well: yes    Inserted with ultrasound guidance: no    Transvaginal sono confirmed fundal placement:  no    Estimated blood loss (mL):  0  Intended removal date: 8 years             MOUNA Bennett-CNP 07/25/24 12:25 PM

## 2024-07-26 LAB
C TRACH RRNA SPEC QL NAA+PROBE: NEGATIVE
N GONORRHOEA DNA SPEC QL PROBE+SIG AMP: NEGATIVE
T VAGINALIS RRNA SPEC QL NAA+PROBE: NEGATIVE

## 2024-07-28 PROBLEM — E11.9 TYPE 2 DIABETES MELLITUS, WITH LONG-TERM CURRENT USE OF INSULIN (MULTI): Status: ACTIVE | Noted: 2024-07-28

## 2024-07-28 PROBLEM — Z79.4 TYPE 2 DIABETES MELLITUS, WITH LONG-TERM CURRENT USE OF INSULIN (MULTI): Status: ACTIVE | Noted: 2024-07-28

## 2024-07-28 LAB — BACTERIA GENITAL AEROBE CULT: ABNORMAL

## 2024-07-28 NOTE — ASSESSMENT & PLAN NOTE
To continue Lantus 15 units SQ daily if glucose values rise above 150mg/dL    To discontinue Jardiance 25mg once daily given vaginal yeast infections   To increase Trulicity to 1.5mg subcutaneous once weekly   Counseled that the goal A1C should be 7% or less  Counseled glycemic control is warranted to prevent microvascular complications  To continue the use of your CGM for the intensive glucose monitoring due to the dynamic nature of insulin requirements, the narrow therapeutic index of insulin and the potentially fatal consequences of treatment  Counseled that the time in range should be >70%  Please rotate insulin injection sites  For follow up in 3 months

## 2024-07-29 DIAGNOSIS — N89.8 VAGINAL DISCHARGE: Primary | ICD-10-CM

## 2024-07-29 RX ORDER — FLUCONAZOLE 150 MG/1
150 TABLET ORAL
Qty: 4 TABLET | Refills: 1 | Status: SHIPPED | OUTPATIENT
Start: 2024-07-29 | End: 2024-07-30 | Stop reason: SDUPTHER

## 2024-07-30 DIAGNOSIS — N89.8 VAGINAL DISCHARGE: ICD-10-CM

## 2024-07-30 LAB — BACTERIA GENITAL AEROBE CULT: ABNORMAL

## 2024-07-30 RX ORDER — FLUCONAZOLE 150 MG/1
150 TABLET ORAL
Qty: 4 TABLET | Refills: 1 | Status: SHIPPED | OUTPATIENT
Start: 2024-07-30 | End: 2024-08-09

## 2024-07-30 NOTE — TELEPHONE ENCOUNTER
Patient states medication was sent to the wrong pharmacy, she is asking for it to be sent to [facility]

## 2024-07-30 NOTE — TELEPHONE ENCOUNTER
----- Message from Lita Wells sent at 7/29/2024  6:04 PM EDT -----  Please inform patient that her culture returned showing yeast.  Fluconazole was ordered for her to take.

## 2024-08-13 ENCOUNTER — TELEPHONE (OUTPATIENT)
Dept: OBSTETRICS AND GYNECOLOGY | Facility: CLINIC | Age: 39
End: 2024-08-13
Payer: COMMERCIAL

## 2024-08-13 NOTE — TELEPHONE ENCOUNTER
Patient of Lita Wells CNP had an iud inserted on 7/25/24 with a follow up on August 22.  Patient called stating that she has a lot of pain with it and would like a call back

## 2024-08-14 NOTE — TELEPHONE ENCOUNTER
Spoke with patient, states she has had pain in mid section, pelvis and lower back for the last 9 days. No vaginal itching, discharge, urine frequency, pain with urination or fevers. Patient was advised that an ultrasound can be ordered to confirm placement, she declines. Requesting appointment for iud removal. Transferred to scheduling.

## 2024-08-18 ENCOUNTER — APPOINTMENT (OUTPATIENT)
Dept: RADIOLOGY | Facility: HOSPITAL | Age: 39
End: 2024-08-18
Payer: COMMERCIAL

## 2024-08-18 ENCOUNTER — HOSPITAL ENCOUNTER (EMERGENCY)
Facility: HOSPITAL | Age: 39
Discharge: HOME | End: 2024-08-18
Payer: COMMERCIAL

## 2024-08-18 VITALS
SYSTOLIC BLOOD PRESSURE: 123 MMHG | DIASTOLIC BLOOD PRESSURE: 79 MMHG | HEART RATE: 81 BPM | BODY MASS INDEX: 29.16 KG/M2 | HEIGHT: 65 IN | TEMPERATURE: 98.3 F | WEIGHT: 175 LBS | OXYGEN SATURATION: 98 % | RESPIRATION RATE: 16 BRPM

## 2024-08-18 DIAGNOSIS — R10.2 PELVIC PAIN: ICD-10-CM

## 2024-08-18 DIAGNOSIS — N93.9 ABNORMAL UTERINE BLEEDING: Primary | ICD-10-CM

## 2024-08-18 LAB
ALBUMIN SERPL BCP-MCNC: 4.3 G/DL (ref 3.4–5)
ALP SERPL-CCNC: 73 U/L (ref 33–110)
ALT SERPL W P-5'-P-CCNC: 14 U/L (ref 7–45)
AMORPH CRY #/AREA UR COMP ASSIST: ABNORMAL /HPF
ANION GAP SERPL CALC-SCNC: 11 MMOL/L (ref 10–20)
APPEARANCE UR: ABNORMAL
AST SERPL W P-5'-P-CCNC: 10 U/L (ref 9–39)
B-HCG SERPL-ACNC: <2 MIU/ML
BACTERIA #/AREA URNS AUTO: ABNORMAL /HPF
BASOPHILS # BLD AUTO: 0.11 X10*3/UL (ref 0–0.1)
BASOPHILS NFR BLD AUTO: 0.8 %
BILIRUB SERPL-MCNC: 0.4 MG/DL (ref 0–1.2)
BILIRUB UR STRIP.AUTO-MCNC: NEGATIVE MG/DL
BUN SERPL-MCNC: 10 MG/DL (ref 6–23)
CALCIUM SERPL-MCNC: 8.8 MG/DL (ref 8.6–10.3)
CHLORIDE SERPL-SCNC: 108 MMOL/L (ref 98–107)
CO2 SERPL-SCNC: 21 MMOL/L (ref 21–32)
COLOR UR: YELLOW
CREAT SERPL-MCNC: 0.8 MG/DL (ref 0.5–1.05)
EGFRCR SERPLBLD CKD-EPI 2021: >90 ML/MIN/1.73M*2
EOSINOPHIL # BLD AUTO: 0.48 X10*3/UL (ref 0–0.7)
EOSINOPHIL NFR BLD AUTO: 3.6 %
ERYTHROCYTE [DISTWIDTH] IN BLOOD BY AUTOMATED COUNT: 15.9 % (ref 11.5–14.5)
GLUCOSE SERPL-MCNC: 151 MG/DL (ref 74–99)
GLUCOSE UR STRIP.AUTO-MCNC: NORMAL MG/DL
HCG UR QL IA.RAPID: NEGATIVE
HCT VFR BLD AUTO: 38.6 % (ref 36–46)
HGB BLD-MCNC: 12.4 G/DL (ref 12–16)
IMM GRANULOCYTES # BLD AUTO: 0.14 X10*3/UL (ref 0–0.7)
IMM GRANULOCYTES NFR BLD AUTO: 1 % (ref 0–0.9)
KETONES UR STRIP.AUTO-MCNC: NEGATIVE MG/DL
LACTATE SERPL-SCNC: 1.3 MMOL/L (ref 0.4–2)
LEUKOCYTE ESTERASE UR QL STRIP.AUTO: ABNORMAL
LYMPHOCYTES # BLD AUTO: 2.73 X10*3/UL (ref 1.2–4.8)
LYMPHOCYTES NFR BLD AUTO: 20.2 %
MCH RBC QN AUTO: 22.7 PG (ref 26–34)
MCHC RBC AUTO-ENTMCNC: 32.1 G/DL (ref 32–36)
MCV RBC AUTO: 71 FL (ref 80–100)
MONOCYTES # BLD AUTO: 0.98 X10*3/UL (ref 0.1–1)
MONOCYTES NFR BLD AUTO: 7.3 %
MUCOUS THREADS #/AREA URNS AUTO: ABNORMAL /LPF
NEUTROPHILS # BLD AUTO: 9.07 X10*3/UL (ref 1.2–7.7)
NEUTROPHILS NFR BLD AUTO: 67.1 %
NITRITE UR QL STRIP.AUTO: NEGATIVE
NRBC BLD-RTO: 0 /100 WBCS (ref 0–0)
PH UR STRIP.AUTO: 6 [PH]
PLATELET # BLD AUTO: 295 X10*3/UL (ref 150–450)
POTASSIUM SERPL-SCNC: 3.8 MMOL/L (ref 3.5–5.3)
PROT SERPL-MCNC: 7 G/DL (ref 6.4–8.2)
PROT UR STRIP.AUTO-MCNC: ABNORMAL MG/DL
RBC # BLD AUTO: 5.46 X10*6/UL (ref 4–5.2)
RBC # UR STRIP.AUTO: ABNORMAL /UL
RBC #/AREA URNS AUTO: ABNORMAL /HPF
SODIUM SERPL-SCNC: 136 MMOL/L (ref 136–145)
SP GR UR STRIP.AUTO: 1.03
SQUAMOUS #/AREA URNS AUTO: ABNORMAL /HPF
UROBILINOGEN UR STRIP.AUTO-MCNC: ABNORMAL MG/DL
WBC # BLD AUTO: 13.5 X10*3/UL (ref 4.4–11.3)
WBC #/AREA URNS AUTO: ABNORMAL /HPF

## 2024-08-18 PROCEDURE — 84702 CHORIONIC GONADOTROPIN TEST: CPT | Performed by: NURSE PRACTITIONER

## 2024-08-18 PROCEDURE — 81001 URINALYSIS AUTO W/SCOPE: CPT | Performed by: NURSE PRACTITIONER

## 2024-08-18 PROCEDURE — 87086 URINE CULTURE/COLONY COUNT: CPT | Mod: PORLAB | Performed by: NURSE PRACTITIONER

## 2024-08-18 PROCEDURE — 83605 ASSAY OF LACTIC ACID: CPT | Performed by: NURSE PRACTITIONER

## 2024-08-18 PROCEDURE — 36415 COLL VENOUS BLD VENIPUNCTURE: CPT | Performed by: NURSE PRACTITIONER

## 2024-08-18 PROCEDURE — 96374 THER/PROPH/DIAG INJ IV PUSH: CPT

## 2024-08-18 PROCEDURE — 76830 TRANSVAGINAL US NON-OB: CPT

## 2024-08-18 PROCEDURE — 80053 COMPREHEN METABOLIC PANEL: CPT | Performed by: NURSE PRACTITIONER

## 2024-08-18 PROCEDURE — 96361 HYDRATE IV INFUSION ADD-ON: CPT

## 2024-08-18 PROCEDURE — 76830 TRANSVAGINAL US NON-OB: CPT | Mod: FOREIGN READ | Performed by: RADIOLOGY

## 2024-08-18 PROCEDURE — 2500000004 HC RX 250 GENERAL PHARMACY W/ HCPCS (ALT 636 FOR OP/ED): Performed by: NURSE PRACTITIONER

## 2024-08-18 PROCEDURE — 99284 EMERGENCY DEPT VISIT MOD MDM: CPT | Mod: 25

## 2024-08-18 PROCEDURE — 76856 US EXAM PELVIC COMPLETE: CPT | Mod: FOREIGN READ | Performed by: RADIOLOGY

## 2024-08-18 PROCEDURE — 81025 URINE PREGNANCY TEST: CPT | Performed by: NURSE PRACTITIONER

## 2024-08-18 PROCEDURE — 85025 COMPLETE CBC W/AUTO DIFF WBC: CPT | Performed by: NURSE PRACTITIONER

## 2024-08-18 PROCEDURE — 2500000001 HC RX 250 WO HCPCS SELF ADMINISTERED DRUGS (ALT 637 FOR MEDICARE OP): Performed by: NURSE PRACTITIONER

## 2024-08-18 RX ORDER — OXYCODONE HYDROCHLORIDE 5 MG/1
5 TABLET ORAL EVERY 6 HOURS PRN
Qty: 12 TABLET | Refills: 0 | Status: SHIPPED | OUTPATIENT
Start: 2024-08-18 | End: 2024-08-21

## 2024-08-18 RX ORDER — KETOROLAC TROMETHAMINE 30 MG/ML
15 INJECTION, SOLUTION INTRAMUSCULAR; INTRAVENOUS ONCE
Status: COMPLETED | OUTPATIENT
Start: 2024-08-18 | End: 2024-08-18

## 2024-08-18 RX ORDER — OXYCODONE AND ACETAMINOPHEN 5; 325 MG/1; MG/1
1 TABLET ORAL ONCE
Status: COMPLETED | OUTPATIENT
Start: 2024-08-18 | End: 2024-08-18

## 2024-08-18 ASSESSMENT — PAIN DESCRIPTION - DESCRIPTORS: DESCRIPTORS: ACHING;SHARP;SHOOTING

## 2024-08-18 ASSESSMENT — COLUMBIA-SUICIDE SEVERITY RATING SCALE - C-SSRS
1. IN THE PAST MONTH, HAVE YOU WISHED YOU WERE DEAD OR WISHED YOU COULD GO TO SLEEP AND NOT WAKE UP?: NO
2. HAVE YOU ACTUALLY HAD ANY THOUGHTS OF KILLING YOURSELF?: NO
6. HAVE YOU EVER DONE ANYTHING, STARTED TO DO ANYTHING, OR PREPARED TO DO ANYTHING TO END YOUR LIFE?: NO

## 2024-08-18 ASSESSMENT — PAIN DESCRIPTION - LOCATION
LOCATION: HIP
LOCATION: PELVIS

## 2024-08-18 ASSESSMENT — PAIN SCALES - GENERAL
PAINLEVEL_OUTOF10: 8
PAINLEVEL_OUTOF10: 6
PAINLEVEL_OUTOF10: 8

## 2024-08-18 ASSESSMENT — PAIN - FUNCTIONAL ASSESSMENT
PAIN_FUNCTIONAL_ASSESSMENT: 0-10
PAIN_FUNCTIONAL_ASSESSMENT: 0-10

## 2024-08-18 ASSESSMENT — PAIN DESCRIPTION - PAIN TYPE: TYPE: ACUTE PAIN

## 2024-08-18 ASSESSMENT — VISUAL ACUITY: OU: 1

## 2024-08-18 NOTE — ED PROVIDER NOTES
"    HPI   Chief Complaint   Patient presents with    Hip Pain     PELVIC pain.   Pt had IUD placed 1 month ago, having acute on chronic pain since with vaginal bleeding x25 days. Followed with OBGYN and was scheduled with outpatient US 8/27 but pt states \"can't stand the pain and I need this out!\"    Vaginal Bleeding       Patient presents the emergency department for ultrasound due to her IUD being misplaced.  Patient went to her doctor's office, Dr. Holland to have her IUD removed.  They were unable to remove it as her strings were not visualized.  She has been having complication with her IUD which was placed 1 month ago as she is having persistent abnormal uterine bleeding and discomfort secondary to it.  She got this placed due to endometriosis.  She had persistent bleeding when she had a Nexplanon prior to this.  She has no concern for STI, UTI or pregnancy.  She notices dyspareunia ever since having the IUD placed as well.  She does not endorse any syncope, fever, chills, chest pain, shortness of breath, nausea, vomiting, bowel changes, back pain or traumatic incident.  She would like to have her IUD removed.  Denies needing a blood transfusion in the past but does admit to thalassemia minor and sickle cell trait.      History provided by:  Patient   used: No                          Senthil Coma Scale Score: 15                  Patient History   Past Medical History:   Diagnosis Date    Acute upper respiratory infection, unspecified 03/17/2016    Acute upper respiratory infection    Body mass index (BMI) 38.0-38.9, adult     BMI 38.0-38.9,adult    Candidiasis, unspecified 04/25/2017    Yeast infection    Cellulitis of right finger 02/05/2020    Paronychia of finger of right hand    Diseases of the respiratory system complicating pregnancy, unspecified trimester ([facility]) 10/24/2022    Asthma affecting pregnancy, antepartum    Essential (primary) hypertension 08/21/2019    Essential " hypertension with goal blood pressure less than 130/80    Ingrowing nail 2020    Ingrowing nail with infection    Localized edema 2016    Leg edema, left    Obesity complicating pregnancy, first trimester ([facility]) 2017    Obesity during pregnancy in first trimester    Other conditions influencing health status 2019    History of cough    Personal history of gestational diabetes     History of gestational diabetes    Personal history of gestational diabetes 2019    History of gestational diabetes mellitus (GDM)    Personal history of other diseases of the female genital tract 2015    History of amenorrhea    Personal history of other diseases of the respiratory system 2016    History of acute pharyngitis    Personal history of other diseases of the respiratory system 2019    History of acute bacterial sinusitis    Personal history of other diseases of urinary system 2014    History of hematuria    Personal history of other specified conditions 2015    History of nausea    Personal history of other specified conditions 2014    History of flank pain    Supervision of pregnancy with other poor reproductive or obstetric history, first trimester ([facility]) 10/24/2022    History of gestational diabetes in prior pregnancy, currently pregnant, first trimester    Unspecified maternal hypertension, first trimester ([facility]) 2017    Hypertension during pregnancy in first trimester    Urinary tract infection, site not specified 2014    Recurrent UTI     Past Surgical History:   Procedure Laterality Date    OTHER SURGICAL HISTORY  2020     section    OTHER SURGICAL HISTORY  2020    Hernia repair    OTHER SURGICAL HISTORY  2020    Laparoscopic endometrioma fulguration     Family History   Problem Relation Name Age of Onset    Diabetes Mother      Diabetes Brother       Social History     Tobacco Use    Smoking status: Every Day  "    Types: Cigarettes    Smokeless tobacco: Never   Vaping Use    Vaping status: Never Used   Substance Use Topics    Alcohol use: Yes     Comment: socially    Drug use: Never       Physical Exam   Visit Vitals  /79   Pulse 81   Temp 36.8 °C (98.3 °F) (Tympanic)   Resp 16   Ht 1.651 m (5' 5\")   Wt 79.4 kg (175 lb)   LMP 07/23/2024   SpO2 98%   BMI 29.12 kg/m²   OB Status Implant   Smoking Status Every Day   BSA 1.91 m²      Physical Exam  Vitals reviewed.   Constitutional:       Appearance: Normal appearance. She is not toxic-appearing.   HENT:      Head: Normocephalic and atraumatic.      Right Ear: Hearing normal.      Left Ear: Hearing normal.      Nose: Nose normal.      Mouth/Throat:      Lips: Pink.      Mouth: Mucous membranes are moist.   Eyes:      General: Vision grossly intact.   Cardiovascular:      Rate and Rhythm: Normal rate and regular rhythm.      Pulses:           Radial pulses are 2+ on the left side.      Comments: No resting tachycardia.  Pulmonary:      Effort: Pulmonary effort is normal.      Breath sounds: Normal breath sounds.   Abdominal:      General: Bowel sounds are normal.      Palpations: Abdomen is soft.      Tenderness: There is abdominal tenderness in the suprapubic area. There is no right CVA tenderness or left CVA tenderness.   Genitourinary:     Comments: Pelvic exam deferred  Musculoskeletal:      Cervical back: Normal range of motion and neck supple.      Right lower leg: No edema.      Left lower leg: No edema.   Skin:     General: Skin is warm and dry.      Capillary Refill: Capillary refill takes less than 2 seconds.      Coloration: Skin is not pale.   Neurological:      Mental Status: She is alert and oriented to person, place, and time.      Sensory: Sensation is intact.      Motor: Motor function is intact.      Coordination: Coordination is intact.      Gait: Gait is intact.   Psychiatric:         Mood and Affect: Mood and affect normal.         Behavior: Behavior " is cooperative.         Thought Content: Thought content normal.         US PELVIS TRANSABDOMINAL WITH TRANSVAGINAL   Final Result   IUD within appropriate position. The uterus is unremarkable.   Normal color and spectral Doppler flow within both ovaries. No free   fluid.   Signed by Xavi Magdaleno MD          Labs Reviewed   CBC WITH AUTO DIFFERENTIAL - Abnormal       Result Value    WBC 13.5 (*)     nRBC 0.0      RBC 5.46 (*)     Hemoglobin 12.4      Hematocrit 38.6      MCV 71 (*)     MCH 22.7 (*)     MCHC 32.1      RDW 15.9 (*)     Platelets 295      Neutrophils % 67.1      Immature Granulocytes %, Automated 1.0 (*)     Lymphocytes % 20.2      Monocytes % 7.3      Eosinophils % 3.6      Basophils % 0.8      Neutrophils Absolute 9.07 (*)     Immature Granulocytes Absolute, Automated 0.14      Lymphocytes Absolute 2.73      Monocytes Absolute 0.98      Eosinophils Absolute 0.48      Basophils Absolute 0.11 (*)    COMPREHENSIVE METABOLIC PANEL - Abnormal    Glucose 151 (*)     Sodium 136      Potassium 3.8      Chloride 108 (*)     Bicarbonate 21      Anion Gap 11      Urea Nitrogen 10      Creatinine 0.80      eGFR >90      Calcium 8.8      Albumin 4.3      Alkaline Phosphatase 73      Total Protein 7.0      AST 10      Bilirubin, Total 0.4      ALT 14     HCG, URINE, QUALITATIVE - Normal    HCG, Urine NEGATIVE     HUMAN CHORIONIC GONADOTROPIN, SERUM QUANTITATIVE - Normal    HCG, Beta-Quantitative <2      Narrative:      Total HCG measurement is performed using the [employer] Access   Immunoassay which detects intact HCG and free beta HCG subunit.    This test is not indicated for use as a tumor marker.   HCG testing is performed using a different test methodology at [facility] than other [facility]. Direct result comparison   should only be made within the same method.       LACTATE - Normal    Lactate 1.3      Narrative:     Venipuncture immediately after or during the administration  of Metamizole may lead to falsely low results. Testing should be performed immediately  prior to Metamizole dosing.   URINALYSIS WITH REFLEX CULTURE AND MICROSCOPIC    Narrative:     The following orders were created for panel order Urinalysis with Reflex Culture and Microscopic.  Procedure                               Abnormality         Status                     ---------                               -----------         ------                     Urinalysis with Reflex C...[[id number]]                                                 Extra Urine Gray Tube[[id number]]                                                         Please view results for these tests on the individual orders.   URINALYSIS WITH REFLEX CULTURE AND MICROSCOPIC   EXTRA URINE GRAY TUBE       ED Course & MDM     Medical Decision Making  Patient presents to the emergency department for evaluation of displacement of her IUD.  Differential diagnosis of migration of IUD, PID, UTI and ovarian torsion to mention a few.  Plan is for baseline labs, urinalysis, urine pregnancy and ultrasound.  Patient provides consent for this as well as symptom management with IV fluids, Toradol and Percocet.        ED Course as of 08/18/24 2119   Sun Aug 18, 2024   1850 LEUKOCYTES (10*3/UL) IN BLOOD BY AUTOMATED COUNT, [place](!): 13.5 [NA]   1850 HEMOGLOBIN: 12.4 [NA]   1850 HEMATOCRIT: 38.6 [NA]   1850 HCG, Urine: NEGATIVE [NA]   1905 HCG, Beta-Quantitative: <2 [NA]   2115 Patient given her ultrasound report and we discussed at length.  We also reviewed her last CAT scan imaging from a few months ago.  Her fat umbilical hernia is reducible on exam.  She does have diverticulosis with no recent constipation, fever or bowel changes including no rectal bleeding.  Shared decision making for no CT imaging today. Plan is for continuation of over-the-counter Tylenol or ibuprofen as needed, monitor for bleeding and to keep her appointment as scheduled with gynecology.  Will  provide a short course of oxycodone as needed for breakthrough pain. Patient is non-toxic, not hypoxic and appropriate for this outpatient management plan which they prefer. Encouragement to arrange close follow up was discussed as well as provided in a written handout of discharge instructions. Patient was educated on signs of symptoms to watch for indicative of re-evaluation in the emergency department setting to include any worsening of current symptoms. They verbalized understanding of instructions and is amenable to this treatment plan with no social determinants of health that would obscure this plan. Patient departed in stable condition.  [NA]      ED Course User Index  [NA] Eryn Hicks, APRN-CNP         Diagnoses as of 08/18/24 2119   Abnormal uterine bleeding   Pelvic pain          Your medication list        START taking these medications        Instructions Last Dose Given Next Dose Due   oxyCODONE 5 mg immediate release tablet  Commonly known as: Roxicodone      Take 1 tablet (5 mg) by mouth every 6 hours if needed for severe pain (7 - 10) for up to 3 days.              CONTINUE taking these medications        Instructions Last Dose Given Next Dose Due   FreeStyle Mic 2 [place] misc  Generic drug: flash glucose scanning reader           FreeStyle Mic 2 Sensor kit  Generic drug: flash glucose sensor kit                  ASK your doctor about these medications        Instructions Last Dose Given Next Dose Due   amLODIPine 5 mg tablet  Commonly known as: Norvasc           ARIPiprazole 2 mg tablet  Commonly known as: Abilify           atorvastatin 20 mg tablet  Commonly known as: Lipitor           dicyclomine 10 mg capsule  Commonly known as: Bentyl      Take 1 capsule (10 mg) by mouth 4 times a day.       docusate sodium 100 mg capsule  Commonly known as: Colace           Gvoke HypoPen 2-Pack 1 mg/0.2 mL auto-injector  Generic drug: glucagon           labetalol 200 mg tablet  Commonly known as:  Normodyne           Lantus Solostar U-100 Insulin 100 unit/mL (3 mL) pen  Generic drug: insulin glargine           losartan 50 mg tablet  Commonly known as: Cozaar           melatonin 5 mg capsule           ondansetron 4 mg tablet  Commonly known as: Zofran      Take 1 tablet (4 mg) by mouth 3 times a day.       senna 8.6 mg tablet  Generic drug: sennosides           sertraline 100 mg tablet  Commonly known as: Zoloft           topiramate 50 mg tablet  Commonly known as: Topamax           Trulicity 0.75 mg/0.5 mL pen injector  Generic drug: dulaglutide           dulaglutide 1.5 mg/0.5 mL pen injector injection  Commonly known as: Trulicity      Inject 1.5 mg under the skin 1 (one) time per week.                 Where to Get Your Medications        These medications were sent to [facility] [address] [place] [address], [place] - [address] [address]      Phone: [phone]   oxyCODONE 5 mg immediate release tablet         Procedure  None     *This report was transcribed using voice recognition software.  Every effort was made to ensure accuracy; however, inadvertent computerized transcription errors may be present.*  LIDA Carbajal  08/18/24         MOUNA Carbajal-PANCHITO  08/18/24 0680

## 2024-08-18 NOTE — Clinical Note
Ashley Chu was seen and treated in our emergency department on 8/18/2024.  She may return to work on 08/20/2024.       If you have any questions or concerns, please don't hesitate to call.      Eryn Hicks, APRN-CNP

## 2024-08-20 LAB — BACTERIA UR CULT: NORMAL

## 2024-08-22 ENCOUNTER — APPOINTMENT (OUTPATIENT)
Dept: OBSTETRICS AND GYNECOLOGY | Facility: CLINIC | Age: 39
End: 2024-08-22
Payer: COMMERCIAL

## 2024-08-22 ENCOUNTER — HOSPITAL ENCOUNTER (OUTPATIENT)
Dept: RADIOLOGY | Facility: CLINIC | Age: 39
Discharge: HOME | End: 2024-08-22
Payer: COMMERCIAL

## 2024-08-22 VITALS
DIASTOLIC BLOOD PRESSURE: 68 MMHG | BODY MASS INDEX: 28.99 KG/M2 | HEIGHT: 65 IN | WEIGHT: 174 LBS | SYSTOLIC BLOOD PRESSURE: 110 MMHG

## 2024-08-22 DIAGNOSIS — Z30.432 ENCOUNTER FOR IUD REMOVAL: Primary | ICD-10-CM

## 2024-08-22 DIAGNOSIS — J20.9 ACUTE BRONCHITIS, UNSPECIFIED: ICD-10-CM

## 2024-08-22 DIAGNOSIS — R10.2 PELVIC PAIN: ICD-10-CM

## 2024-08-22 PROCEDURE — 3060F POS MICROALBUMINURIA REV: CPT | Performed by: NURSE PRACTITIONER

## 2024-08-22 PROCEDURE — 4010F ACE/ARB THERAPY RXD/TAKEN: CPT | Performed by: NURSE PRACTITIONER

## 2024-08-22 PROCEDURE — 71046 X-RAY EXAM CHEST 2 VIEWS: CPT

## 2024-08-22 PROCEDURE — 3078F DIAST BP <80 MM HG: CPT | Performed by: NURSE PRACTITIONER

## 2024-08-22 PROCEDURE — 3052F HG A1C>EQUAL 8.0%<EQUAL 9.0%: CPT | Performed by: NURSE PRACTITIONER

## 2024-08-22 PROCEDURE — 71046 X-RAY EXAM CHEST 2 VIEWS: CPT | Performed by: RADIOLOGY

## 2024-08-22 PROCEDURE — 3008F BODY MASS INDEX DOCD: CPT | Performed by: NURSE PRACTITIONER

## 2024-08-22 PROCEDURE — 3049F LDL-C 100-129 MG/DL: CPT | Performed by: NURSE PRACTITIONER

## 2024-08-22 PROCEDURE — 58301 REMOVE INTRAUTERINE DEVICE: CPT | Performed by: NURSE PRACTITIONER

## 2024-08-22 PROCEDURE — 3074F SYST BP LT 130 MM HG: CPT | Performed by: NURSE PRACTITIONER

## 2024-08-22 ASSESSMENT — ENCOUNTER SYMPTOMS
PSYCHIATRIC NEGATIVE: 1
RESPIRATORY NEGATIVE: 1
NEUROLOGICAL NEGATIVE: 1
GASTROINTESTINAL NEGATIVE: 1
CONSTITUTIONAL NEGATIVE: 1
ENDOCRINE NEGATIVE: 1
MUSCULOSKELETAL NEGATIVE: 1
EYES NEGATIVE: 1
CARDIOVASCULAR NEGATIVE: 1

## 2024-08-22 NOTE — PROGRESS NOTES
Subjective   Patient ID: Ashley Chu is a 39 y.o. female who presents for IUD removal (Patient had Mirena inserted 7/25/2024./Sent a task to office 8/13/2024 complaining of pain for 9 days and wanting the IUD removed. ).  39 year old here for desire for IUD removal.  She notes that since her IUD was inserted she has had intense pelvic cramping and pain.  She noticed the pain worsen about the 13th of August.  She went to another ob/gyn to attempt removal and the IUD strings were not seen.  She then went to the ER where they confirmed the IUD was in place.  She desires the IUD to be removed. IUD was originally inserted to help with painful and heavy periods.  She desires to have a hysterectomy to help with this as well. She is also diagnosed with a hernia and diverticulosis.          Review of Systems   Constitutional: Negative.    HENT: Negative.     Eyes: Negative.    Respiratory: Negative.     Cardiovascular: Negative.    Gastrointestinal: Negative.    Endocrine: Negative.    Genitourinary:  Positive for pelvic pain.   Musculoskeletal: Negative.    Skin: Negative.    Neurological: Negative.    Psychiatric/Behavioral: Negative.         Objective   Physical Exam  Vitals reviewed.   Constitutional:       Appearance: Normal appearance. She is well-developed.   Pulmonary:      Effort: Pulmonary effort is normal. No respiratory distress.   Chest:   Breasts:     Breasts are symmetrical.      Right: Normal. No swelling, bleeding, inverted nipple, mass, nipple discharge, skin change or tenderness.      Left: Normal. No swelling, bleeding, inverted nipple, mass, nipple discharge, skin change or tenderness.   Abdominal:      Palpations: Abdomen is soft.   Genitourinary:     General: Normal vulva.      Exam position: Lithotomy position.      Pubic Area: No rash.       Labia:         Right: No rash, tenderness, lesion or injury.         Left: No rash, tenderness, lesion or injury.       Urethra: No prolapse, urethral  pain, urethral swelling or urethral lesion.      Vagina: Vaginal discharge present.      Cervix: Normal.      Uterus: Normal.       Adnexa: Right adnexa normal and left adnexa normal.      Rectum: Normal.      Comments: Iud strings visualized  Musculoskeletal:         General: Normal range of motion.   Lymphadenopathy:      Upper Body:      Right upper body: No supraclavicular, axillary or pectoral adenopathy.      Left upper body: No supraclavicular, axillary or pectoral adenopathy.   Skin:     General: Skin is warm and dry.   Neurological:      General: No focal deficit present.      Mental Status: She is alert and oriented to person, place, and time. Mental status is at baseline.   Psychiatric:         Attention and Perception: Attention and perception normal.         Mood and Affect: Mood and affect normal.         Speech: Speech normal.         Behavior: Behavior normal. Behavior is cooperative.         Thought Content: Thought content normal.         Judgment: Judgment normal.     Patient ID: Ashley Chu is a 39 y.o. female.    IUD Removal    Performed by: LIDA Bennett  Authorized by: LIDA Bennett    Procedure: IUD removal    Reason for removal: patient request    Strings visualized: yes    Cervix cleaned with: iodopovidone    Tenaculum applied to cervix: no    IUD grasped by forceps: yes    Performed with ultrasound guidance: no    IUD removed: yes    Date/Time of Removal:  8/22/2024 1:32 PM  Removed without complications: yes    IUD intact: yes    Cervix manually dilated: no        Assessment/Plan   Problem List Items Addressed This Visit             ICD-10-CM    Pelvic pain R10.2    Encounter for IUD removal - Primary Z30.432   IUD removed  Encouraged to continue with PCP if no improvement in pain to investigate cause of hernia and diverticulosis  Desires surgical consultation, will return with physician, informed can review options but celestino depend on her health and  hernia  Follow up as scheduled         MOUNA Bennett-CNP 08/22/24 1:33 PM

## 2024-08-26 ENCOUNTER — APPOINTMENT (OUTPATIENT)
Dept: RADIOLOGY | Facility: HOSPITAL | Age: 39
End: 2024-08-26
Payer: COMMERCIAL

## 2024-08-26 ENCOUNTER — HOSPITAL ENCOUNTER (EMERGENCY)
Facility: HOSPITAL | Age: 39
Discharge: HOME | End: 2024-08-26
Payer: COMMERCIAL

## 2024-08-26 VITALS
HEIGHT: 65 IN | HEART RATE: 83 BPM | WEIGHT: 175 LBS | BODY MASS INDEX: 29.16 KG/M2 | TEMPERATURE: 99 F | OXYGEN SATURATION: 100 % | DIASTOLIC BLOOD PRESSURE: 94 MMHG | RESPIRATION RATE: 16 BRPM | SYSTOLIC BLOOD PRESSURE: 157 MMHG

## 2024-08-26 DIAGNOSIS — M79.674 GREAT TOE PAIN, RIGHT: Primary | ICD-10-CM

## 2024-08-26 PROCEDURE — 73630 X-RAY EXAM OF FOOT: CPT | Mod: RIGHT SIDE

## 2024-08-26 PROCEDURE — 73630 X-RAY EXAM OF FOOT: CPT | Mod: RT

## 2024-08-26 PROCEDURE — 96372 THER/PROPH/DIAG INJ SC/IM: CPT | Performed by: PHYSICIAN ASSISTANT

## 2024-08-26 PROCEDURE — 99283 EMERGENCY DEPT VISIT LOW MDM: CPT

## 2024-08-26 PROCEDURE — 2500000004 HC RX 250 GENERAL PHARMACY W/ HCPCS (ALT 636 FOR OP/ED): Performed by: PHYSICIAN ASSISTANT

## 2024-08-26 RX ORDER — NAPROXEN 500 MG/1
500 TABLET ORAL
Qty: 30 TABLET | Refills: 0 | Status: SHIPPED | OUTPATIENT
Start: 2024-08-26 | End: 2024-09-10

## 2024-08-26 RX ORDER — KETOROLAC TROMETHAMINE 30 MG/ML
30 INJECTION, SOLUTION INTRAMUSCULAR; INTRAVENOUS ONCE
Status: COMPLETED | OUTPATIENT
Start: 2024-08-26 | End: 2024-08-26

## 2024-08-26 RX ORDER — TIZANIDINE 2 MG/1
2 TABLET ORAL EVERY 6 HOURS PRN
Qty: 30 TABLET | Refills: 0 | Status: SHIPPED | OUTPATIENT
Start: 2024-08-26 | End: 2024-09-05

## 2024-08-26 ASSESSMENT — LIFESTYLE VARIABLES
EVER FELT BAD OR GUILTY ABOUT YOUR DRINKING: NO
TOTAL SCORE: 0
HAVE PEOPLE ANNOYED YOU BY CRITICIZING YOUR DRINKING: NO
HAVE YOU EVER FELT YOU SHOULD CUT DOWN ON YOUR DRINKING: NO
EVER HAD A DRINK FIRST THING IN THE MORNING TO STEADY YOUR NERVES TO GET RID OF A HANGOVER: NO

## 2024-08-26 ASSESSMENT — PAIN DESCRIPTION - ONSET: ONSET: GRADUAL

## 2024-08-26 ASSESSMENT — PAIN DESCRIPTION - DESCRIPTORS: DESCRIPTORS: SHARP

## 2024-08-26 ASSESSMENT — PAIN DESCRIPTION - LOCATION: LOCATION: TOE (COMMENT WHICH ONE)

## 2024-08-26 ASSESSMENT — PAIN DESCRIPTION - PAIN TYPE: TYPE: ACUTE PAIN

## 2024-08-26 ASSESSMENT — PAIN SCALES - GENERAL: PAINLEVEL_OUTOF10: 9

## 2024-08-26 ASSESSMENT — PAIN DESCRIPTION - FREQUENCY: FREQUENCY: CONSTANT/CONTINUOUS

## 2024-08-26 ASSESSMENT — PAIN DESCRIPTION - ORIENTATION: ORIENTATION: RIGHT

## 2024-08-26 ASSESSMENT — PAIN - FUNCTIONAL ASSESSMENT: PAIN_FUNCTIONAL_ASSESSMENT: 0-10

## 2024-08-26 ASSESSMENT — PAIN DESCRIPTION - PROGRESSION: CLINICAL_PROGRESSION: GRADUALLY WORSENING

## 2024-08-26 NOTE — ED PROVIDER NOTES
EMERGENCY MEDICINE EVALUATION NOTE    History of Present Illness     Chief Complaint:   Chief Complaint   Patient presents with    Toe Pain     Right great toe       HPI: Ashley Chu is a 39 y.o. female presents with a chief complaint of right great toe pain.  Patient reports the pain has been going on for a few days now.  She states that she noted it got worse after driving the other day.  She does mention when he pressed the gas pedal started having pain in the toe.  She states she has no specific injury to the toe.  She denies any history of gout.  She denies any erythema or warmth surrounding the base of the right great toe.  Patient did not take anything at home for symptoms other than ibuprofen.  She states that she took her last dose yesterday evening.  Patient Nuys any previous surgeries or fractures to the right great toe.    Previous History     Past Medical History:   Diagnosis Date    Acute upper respiratory infection, unspecified 03/17/2016    Acute upper respiratory infection    Body mass index (BMI) 38.0-38.9, adult     BMI 38.0-38.9,adult    Candidiasis, unspecified 04/25/2017    Yeast infection    Cellulitis of right finger 02/05/2020    Paronychia of finger of right hand    Diseases of the respiratory system complicating pregnancy, unspecified trimester ([facility]) 10/24/2022    Asthma affecting pregnancy, antepartum    Essential (primary) hypertension 08/21/2019    Essential hypertension with goal blood pressure less than 130/80    Ingrowing nail 02/05/2020    Ingrowing nail with infection    Localized edema 06/21/2016    Leg edema, left    Obesity complicating pregnancy, first trimester ([facility]) 11/28/2017    Obesity during pregnancy in first trimester    Other conditions influencing health status 02/08/2019    History of cough    Personal history of gestational diabetes     History of gestational diabetes    Personal history of gestational diabetes 02/08/2019    History of gestational  diabetes mellitus (GDM)    Personal history of other diseases of the female genital tract 2015    History of amenorrhea    Personal history of other diseases of the respiratory system 2016    History of acute pharyngitis    Personal history of other diseases of the respiratory system 2019    History of acute bacterial sinusitis    Personal history of other diseases of urinary system 2014    History of hematuria    Personal history of other specified conditions 2015    History of nausea    Personal history of other specified conditions 2014    History of flank pain    Supervision of pregnancy with other poor reproductive or obstetric history, first trimester ([facility]) 10/24/2022    History of gestational diabetes in prior pregnancy, currently pregnant, first trimester    Unspecified maternal hypertension, first trimester ([facility]) 2017    Hypertension during pregnancy in first trimester    Urinary tract infection, site not specified 2014    Recurrent UTI     Past Surgical History:   Procedure Laterality Date    OTHER SURGICAL HISTORY  2020     section    OTHER SURGICAL HISTORY  2020    Hernia repair    OTHER SURGICAL HISTORY  2020    Laparoscopic endometrioma fulguration     Social History     Tobacco Use    Smoking status: Every Day     Types: Cigarettes    Smokeless tobacco: Never   Vaping Use    Vaping status: Never Used   Substance Use Topics    Alcohol use: Yes     Comment: socially    Drug use: Never     Family History   Problem Relation Name Age of Onset    Diabetes Mother      Diabetes Brother       Allergies   Allergen Reactions    Metformin Diarrhea     Current Outpatient Medications   Medication Instructions    amLODIPine (Norvasc) 5 mg tablet 1 tablet, oral, Every 12 hours    ARIPiprazole (ABILIFY) 2 mg, oral, Nightly    atorvastatin (LIPITOR) 20 mg, oral, Daily    dicyclomine (BENTYL) 10 mg, oral, 4 times daily    docusate sodium  (COLACE) 100 mg, oral, Daily    dulaglutide (TRULICITY) 1.5 mg, subcutaneous, Once Weekly    FreeStyle Mic 2 Barre misc USE AS DIRECTED    FreeStyle Mic 2 Sensor kit USE AS DIRECTED    Gvoke HypoPen 2-Pack 1 mg, subcutaneous, As needed    labetalol (Normodyne) 200 mg tablet 1 tablet, oral, Every 12 hours    Lantus Solostar U-100 Insulin 15 Units, subcutaneous, Nightly    losartan (Cozaar) 50 mg tablet 1 tablet, oral, Every 12 hours scheduled (0630,1830)    melatonin 10 mg, oral, Nightly PRN    naproxen (NAPROSYN) 500 mg, oral, 2 times daily (morning and late afternoon)    ondansetron (ZOFRAN) 4 mg, oral, 3 times daily    senna 8.6 mg tablet 2 tablets, oral, As needed    sertraline (ZOLOFT) 100 mg, oral, Daily    tiZANidine (ZANAFLEX) 2 mg, oral, Every 6 hours PRN    topiramate (TOPAMAX) 50 mg, oral, Daily    Trulicity 0.75 mg, subcutaneous, Once Weekly       Physical Exam     Appearance: Alert, oriented , cooperative     Skin: Intact,  dry skin, no lesions, rash, petechiae or purpura.      Eyes: PERRLA, EOMs intact,  Conjunctiva pink      ENT: Hearing grossly intact. Pharynx clear      Neck: Supple. Trachea at midline.      Pulmonary: Clear bilaterally. No rales, rhonchi or wheezing. No accessory muscle use or stridor.     Cardiac: Normal rate and rhythm without murmur     Abdomen: Soft, nontender, active bowel sounds.     Musculoskeletal: Full range of motion.  Patient has diffuse tenderness about the base of the right great toe.  No obvious erythema or warmth consistent with podagra.  Neuro vas intact right lower extremity with intact DP pulse.     Neurological:Cranial nerves II through XII are grossly intact, normal sensation, no weakness, no focal findings identified.     Results   Labs Reviewed - No data to display  XR foot right 3+ views   Final Result   No acute osseous abnormality             MACRO:   None        Signed by: Mrya Goss 8/26/2024 9:27 AM   Dictation workstation:   [id number]     "        ED Course & Medical Decision Making     Medications   ketorolac (Toradol) injection 30 mg (30 mg intramuscular Given 8/26/24 0928)     Heart Rate:  [83]   Temperature:  [37.2 °C (99 °F)]   Respirations:  [16]   BP: (157)/(94)   Height:  [165.1 cm (5' 5\")]   Weight:  [79.4 kg (175 lb)]   Pulse Ox:  [100 %]    ED Course as of 08/26/24 1007   Mon Aug 26, 2024   0933 Updated the patient on the results of the x-ray.  Patient's x-ray did not show any acute fractures or dislocations.  Patient just received Toradol so she states she has not had a chance for her to work yet however she is in agreement the plan of care of discharge.  Patient be given naproxen as well as tizanidine at home for symptoms.  Patient was encouraged to follow-up with primary care provider 1 to 2 days return here immediately with any worsening symptoms.  Patient had no signs or symptoms concerning for gout so I do not think she is to be treated for this. [CJ]      ED Course User Index  [CJ] Jorge A Sheffield PA-C         Diagnoses as of 08/26/24 1007   Great toe pain, right       Procedures   Procedures    Diagnosis     1. Great toe pain, right        Disposition   Discharged    ED Prescriptions       Medication Sig Dispense Start Date End Date Auth. Provider    naproxen (Naprosyn) 500 mg tablet Take 1 tablet (500 mg) by mouth 2 times daily (morning and late afternoon) for 15 days. 30 tablet 8/26/2024 9/10/2024 Jorge A Sheffield PA-C    tiZANidine (Zanaflex) 2 mg tablet Take 1 tablet (2 mg) by mouth every 6 hours if needed for muscle spasms for up to 10 days. 30 tablet 8/26/2024 9/5/2024 Jorge A Sheffield PA-C            Disclaimer: This note was dictated by speech recognition. Minor errors in transcription may be present. Please call if questions.       Jorge A Sheffield PA-C  08/26/24 1009    "

## 2024-08-27 DIAGNOSIS — G56.01 CARPAL TUNNEL SYNDROME OF RIGHT WRIST: ICD-10-CM

## 2024-08-29 ENCOUNTER — HOSPITAL ENCOUNTER (OUTPATIENT)
Dept: RADIOLOGY | Facility: HOSPITAL | Age: 39
Discharge: HOME | End: 2024-08-29
Payer: COMMERCIAL

## 2024-08-29 ENCOUNTER — OFFICE VISIT (OUTPATIENT)
Dept: ORTHOPEDIC SURGERY | Facility: HOSPITAL | Age: 39
End: 2024-08-29
Payer: COMMERCIAL

## 2024-08-29 VITALS — WEIGHT: 175 LBS | BODY MASS INDEX: 29.16 KG/M2 | HEIGHT: 65 IN

## 2024-08-29 DIAGNOSIS — G56.01 CARPAL TUNNEL SYNDROME ON RIGHT: Primary | ICD-10-CM

## 2024-08-29 DIAGNOSIS — G56.01 CARPAL TUNNEL SYNDROME OF RIGHT WRIST: ICD-10-CM

## 2024-08-29 PROCEDURE — 4010F ACE/ARB THERAPY RXD/TAKEN: CPT | Performed by: ORTHOPAEDIC SURGERY

## 2024-08-29 PROCEDURE — 3049F LDL-C 100-129 MG/DL: CPT | Performed by: ORTHOPAEDIC SURGERY

## 2024-08-29 PROCEDURE — 3008F BODY MASS INDEX DOCD: CPT | Performed by: ORTHOPAEDIC SURGERY

## 2024-08-29 PROCEDURE — 73110 X-RAY EXAM OF WRIST: CPT | Mod: RT

## 2024-08-29 PROCEDURE — 99204 OFFICE O/P NEW MOD 45 MIN: CPT | Performed by: ORTHOPAEDIC SURGERY

## 2024-08-29 PROCEDURE — 3060F POS MICROALBUMINURIA REV: CPT | Performed by: ORTHOPAEDIC SURGERY

## 2024-08-29 PROCEDURE — 3052F HG A1C>EQUAL 8.0%<EQUAL 9.0%: CPT | Performed by: ORTHOPAEDIC SURGERY

## 2024-08-29 NOTE — PROGRESS NOTES
39 y.o. female presents today for evaluation of right hand numbness, tingling, weakness and pain. The patient reports symptoms for months, getting worse.  Pain is controlled.  Reports no previous surgeries, injections or trauma to the area.  Reports pain worse with use, better at rest.  Pain numb and tingly, wakes them from sleep.   Worse driving a car and talking on a phone.    she did have a shot for what appears to be de Quervain's many years ago.    Review of Systems    Constitutional: see HPI, no fever, no chills, not feeling tired, no significant weight gain or weight loss.   Eyes: No vision changes  ENT: no nosebleeds.   Cardiovascular: no chest pain.   Respiratory: no shortness of breath and no cough.   Gastrointestinal: no abdominal pain, no nausea, no vomiting and no diarrhea.   Musculoskeletal: per HPI  Neurological: no headache, no gait disturbances  Psychiatric: no depression and no sleep disturbances.   Endocrine: no muscle weakness and no muscle cramps.   Hematologic/Lymphatic: no swollen glands and no tendency for easy bruising or excessive swelling.     Patient's past medical history, past surgical history, allergies, and medications have been reviewed unless otherwise noted in the chart.     Carpal Tunnel Exam  Inspection:  no evidence of infection, no edema, no erythema, no ecchymosis, Palpation:  compartments are soft, no pain with palpation, Range of Motion:  full wrist and finger range of motion, Stability:  no wrist instability detected, Strength:  5/5 APB and intrinsics, Skin:  intact, Vascular:  capillary refill <2 seconds distally, Sensation:  decreased in the median nerve distribution, Test:  positive Tinel's at the Carpal Tunnel, positive Direct Carpal Tunnel Compression Test      Constitutional   General appearance: Alert and in no acute distress. Well developed, well nourished.    Eyes   External Eye, Conjunctiva and lids: Normal external exam - pupils were equal in size, round,  reactive to light (PERRL) with normal accommodation and extraocular movements intact (EOMI).   Ears, Nose, Mouth, and Throat   Hearing: Normal.   Neck   Neck: No neck mass was observed. Supple.   Pulmonary   Respiratory effort: No respiratory distress.   Cardiovascular   Examination of extremities: No peripheral edema.   Psychiatric   Judgment and insight: Intact.   Orientation to person, place, and time: Alert and oriented x 3.       Mood and affect: Normal.      XR - no fractures, no dislocations, or no osseous abnormalities  right wrist    X-rays were personally reviewed by me. Radiology reports were reviewed by me as well, if available at the time.        Right carpal tunnel syndrome  Based on the history, physical exam and imaging studies above, the patient's presentation is consistent with the above diagnosis.  I had a long discussion with the patient regarding their presentation and the treatment options.  We discussed initial nonoperative versus operative management options as well as potential further diagnostic imaging.  We again discussed her treatment options going forward along with their associated risks and benefits. After thorough discussion, the patient has elected to proceed with conservative management. All questions were answered to the patients satisfaction who seems satisfied with the plan.  They will call the office with any questions/concerns.     night splint   EMG   follow-up after EMG no x-ray

## 2024-08-29 NOTE — PROGRESS NOTES
NPV referred by dr.Nancy Posadas  Right wrist and hand xrays done today   Patient states she injured the wrist in 2010   She received an injection at that time she did get some relief, she states since having children the pain is increasing also states she is now feeling numbness up the arm constantly.

## 2024-09-16 ENCOUNTER — APPOINTMENT (OUTPATIENT)
Dept: OBSTETRICS AND GYNECOLOGY | Facility: CLINIC | Age: 39
End: 2024-09-16
Payer: COMMERCIAL

## 2024-09-25 ENCOUNTER — APPOINTMENT (OUTPATIENT)
Dept: NEUROLOGY | Facility: CLINIC | Age: 39
End: 2024-09-25
Payer: COMMERCIAL

## 2024-09-25 VITALS
SYSTOLIC BLOOD PRESSURE: 127 MMHG | WEIGHT: 185 LBS | HEIGHT: 66 IN | BODY MASS INDEX: 29.73 KG/M2 | HEART RATE: 56 BPM | DIASTOLIC BLOOD PRESSURE: 68 MMHG | TEMPERATURE: 97.1 F

## 2024-09-25 DIAGNOSIS — G56.01 CARPAL TUNNEL SYNDROME, RIGHT: Primary | ICD-10-CM

## 2024-09-25 PROCEDURE — 95886 MUSC TEST DONE W/N TEST COMP: CPT | Performed by: PSYCHIATRY & NEUROLOGY

## 2024-09-25 PROCEDURE — 95909 NRV CNDJ TST 5-6 STUDIES: CPT | Performed by: PSYCHIATRY & NEUROLOGY

## 2024-09-25 RX ORDER — ALBUTEROL SULFATE 90 UG/1
AEROSOL, METERED RESPIRATORY (INHALATION)
COMMUNITY

## 2024-09-25 RX ORDER — ALBUTEROL SULFATE 0.83 MG/ML
SOLUTION RESPIRATORY (INHALATION)
COMMUNITY

## 2024-09-25 RX ORDER — ALBUTEROL SULFATE 0.83 MG/ML
SOLUTION RESPIRATORY (INHALATION) EVERY 8 HOURS
COMMUNITY
Start: 2019-02-08

## 2024-09-25 NOTE — PROGRESS NOTES
PRELIMINARY EMG REPORT    This study is abnormal.  There is electrophysiological evidence for a mild median mononeuropathy at the right wrist (e.g. carpal tunnel syndrome), without active denervation of the thenar muscles.  There is no electrophysiological evidence for another large-fiber peripheral neuropathy, cervical radiculopathy, or brachial plexopathy in the right arm.    Peter Hopper Jr., M.D., FAAN, FAANEM

## 2024-09-25 NOTE — LETTER
September 25, 2024     Arturo Cuello DO  [address]  [place] [address]    Patient: Ashley Chu   YOB: 1985   Date of Visit: 9/25/2024       Dear Dr. Arturo Cuello DO:    Thank you for referring Ashley Chu to me for EMG/NCS. Below are my notes for this visit.  If you have questions, please do not hesitate to call me.       Sincerely,     Peter Hopper Jr., M.D., MARYLOU     ______________________________________________________________________________________    PRELIMINARY EMG REPORT    This study is abnormal.  There is electrophysiological evidence for a mild median mononeuropathy at the right wrist (e.g. carpal tunnel syndrome), without active denervation of the thenar muscles.  There is no electrophysiological evidence for another large-fiber peripheral neuropathy, cervical radiculopathy, or brachial plexopathy in the right arm.    Peter Hopper Jr., M.D., GREG HICKMAN

## 2024-10-18 ENCOUNTER — APPOINTMENT (OUTPATIENT)
Dept: RADIOLOGY | Facility: HOSPITAL | Age: 39
End: 2024-10-18
Payer: COMMERCIAL

## 2024-10-18 ENCOUNTER — HOSPITAL ENCOUNTER (EMERGENCY)
Facility: HOSPITAL | Age: 39
Discharge: HOME | End: 2024-10-18
Payer: COMMERCIAL

## 2024-10-18 VITALS
DIASTOLIC BLOOD PRESSURE: 98 MMHG | TEMPERATURE: 98.5 F | HEIGHT: 65 IN | HEART RATE: 84 BPM | BODY MASS INDEX: 30.49 KG/M2 | WEIGHT: 183 LBS | SYSTOLIC BLOOD PRESSURE: 152 MMHG | OXYGEN SATURATION: 98 % | RESPIRATION RATE: 20 BRPM

## 2024-10-18 DIAGNOSIS — R05.1 ACUTE COUGH: Primary | ICD-10-CM

## 2024-10-18 LAB
ALBUMIN SERPL BCP-MCNC: 4.6 G/DL (ref 3.4–5)
ALP SERPL-CCNC: 79 U/L (ref 33–110)
ALT SERPL W P-5'-P-CCNC: 21 U/L (ref 7–45)
ANION GAP SERPL CALC-SCNC: 10 MMOL/L (ref 10–20)
AST SERPL W P-5'-P-CCNC: 12 U/L (ref 9–39)
BASOPHILS # BLD AUTO: 0.12 X10*3/UL (ref 0–0.1)
BASOPHILS NFR BLD AUTO: 1 %
BILIRUB SERPL-MCNC: 0.5 MG/DL (ref 0–1.2)
BUN SERPL-MCNC: 9 MG/DL (ref 6–23)
CALCIUM SERPL-MCNC: 9.3 MG/DL (ref 8.6–10.3)
CHLORIDE SERPL-SCNC: 106 MMOL/L (ref 98–107)
CO2 SERPL-SCNC: 22 MMOL/L (ref 21–32)
CREAT SERPL-MCNC: 0.68 MG/DL (ref 0.5–1.05)
EGFRCR SERPLBLD CKD-EPI 2021: >90 ML/MIN/1.73M*2
EOSINOPHIL # BLD AUTO: 0.52 X10*3/UL (ref 0–0.7)
EOSINOPHIL NFR BLD AUTO: 4.5 %
ERYTHROCYTE [DISTWIDTH] IN BLOOD BY AUTOMATED COUNT: 15.9 % (ref 11.5–14.5)
FLUAV RNA RESP QL NAA+PROBE: NOT DETECTED
FLUBV RNA RESP QL NAA+PROBE: NOT DETECTED
GLUCOSE SERPL-MCNC: 112 MG/DL (ref 74–99)
HCT VFR BLD AUTO: 39.8 % (ref 36–46)
HGB BLD-MCNC: 13 G/DL (ref 12–16)
IMM GRANULOCYTES # BLD AUTO: 0.13 X10*3/UL (ref 0–0.7)
IMM GRANULOCYTES NFR BLD AUTO: 1.1 % (ref 0–0.9)
LACTATE SERPL-SCNC: 1 MMOL/L (ref 0.4–2)
LYMPHOCYTES # BLD AUTO: 2.84 X10*3/UL (ref 1.2–4.8)
LYMPHOCYTES NFR BLD AUTO: 24.7 %
MCH RBC QN AUTO: 22.9 PG (ref 26–34)
MCHC RBC AUTO-ENTMCNC: 32.7 G/DL (ref 32–36)
MCV RBC AUTO: 70 FL (ref 80–100)
MONOCYTES # BLD AUTO: 0.83 X10*3/UL (ref 0.1–1)
MONOCYTES NFR BLD AUTO: 7.2 %
NEUTROPHILS # BLD AUTO: 7.08 X10*3/UL (ref 1.2–7.7)
NEUTROPHILS NFR BLD AUTO: 61.5 %
NRBC BLD-RTO: 0 /100 WBCS (ref 0–0)
PLATELET # BLD AUTO: 295 X10*3/UL (ref 150–450)
POTASSIUM SERPL-SCNC: 3.9 MMOL/L (ref 3.5–5.3)
PROT SERPL-MCNC: 7.5 G/DL (ref 6.4–8.2)
RBC # BLD AUTO: 5.68 X10*6/UL (ref 4–5.2)
SARS-COV-2 RNA RESP QL NAA+PROBE: NOT DETECTED
SODIUM SERPL-SCNC: 134 MMOL/L (ref 136–145)
WBC # BLD AUTO: 11.5 X10*3/UL (ref 4.4–11.3)

## 2024-10-18 PROCEDURE — 2500000004 HC RX 250 GENERAL PHARMACY W/ HCPCS (ALT 636 FOR OP/ED): Performed by: PHYSICIAN ASSISTANT

## 2024-10-18 PROCEDURE — 2500000001 HC RX 250 WO HCPCS SELF ADMINISTERED DRUGS (ALT 637 FOR MEDICARE OP): Performed by: PHYSICIAN ASSISTANT

## 2024-10-18 PROCEDURE — 71046 X-RAY EXAM CHEST 2 VIEWS: CPT

## 2024-10-18 PROCEDURE — 83605 ASSAY OF LACTIC ACID: CPT | Performed by: PHYSICIAN ASSISTANT

## 2024-10-18 PROCEDURE — 87635 SARS-COV-2 COVID-19 AMP PRB: CPT | Performed by: PHYSICIAN ASSISTANT

## 2024-10-18 PROCEDURE — 99284 EMERGENCY DEPT VISIT MOD MDM: CPT | Mod: 25

## 2024-10-18 PROCEDURE — 94640 AIRWAY INHALATION TREATMENT: CPT

## 2024-10-18 PROCEDURE — 71046 X-RAY EXAM CHEST 2 VIEWS: CPT | Mod: FOREIGN READ | Performed by: RADIOLOGY

## 2024-10-18 PROCEDURE — 36415 COLL VENOUS BLD VENIPUNCTURE: CPT | Performed by: PHYSICIAN ASSISTANT

## 2024-10-18 PROCEDURE — 80053 COMPREHEN METABOLIC PANEL: CPT | Performed by: PHYSICIAN ASSISTANT

## 2024-10-18 PROCEDURE — 2500000002 HC RX 250 W HCPCS SELF ADMINISTERED DRUGS (ALT 637 FOR MEDICARE OP, ALT 636 FOR OP/ED): Performed by: PHYSICIAN ASSISTANT

## 2024-10-18 PROCEDURE — 85025 COMPLETE CBC W/AUTO DIFF WBC: CPT | Performed by: PHYSICIAN ASSISTANT

## 2024-10-18 PROCEDURE — 96374 THER/PROPH/DIAG INJ IV PUSH: CPT

## 2024-10-18 RX ORDER — IPRATROPIUM BROMIDE AND ALBUTEROL SULFATE 2.5; .5 MG/3ML; MG/3ML
3 SOLUTION RESPIRATORY (INHALATION)
Status: COMPLETED | OUTPATIENT
Start: 2024-10-18 | End: 2024-10-18

## 2024-10-18 RX ORDER — PREDNISONE 50 MG/1
50 TABLET ORAL DAILY
Qty: 5 TABLET | Refills: 0 | Status: SHIPPED | OUTPATIENT
Start: 2024-10-18 | End: 2024-10-23

## 2024-10-18 RX ORDER — DOXYCYCLINE 100 MG/1
100 CAPSULE ORAL 2 TIMES DAILY
Qty: 20 CAPSULE | Refills: 0 | Status: SHIPPED | OUTPATIENT
Start: 2024-10-18 | End: 2024-10-28

## 2024-10-18 RX ORDER — ACETAMINOPHEN 325 MG/1
975 TABLET ORAL ONCE
Status: COMPLETED | OUTPATIENT
Start: 2024-10-18 | End: 2024-10-18

## 2024-10-18 ASSESSMENT — PAIN DESCRIPTION - LOCATION: LOCATION: HEAD

## 2024-10-18 ASSESSMENT — COLUMBIA-SUICIDE SEVERITY RATING SCALE - C-SSRS
2. HAVE YOU ACTUALLY HAD ANY THOUGHTS OF KILLING YOURSELF?: NO
6. HAVE YOU EVER DONE ANYTHING, STARTED TO DO ANYTHING, OR PREPARED TO DO ANYTHING TO END YOUR LIFE?: NO
1. IN THE PAST MONTH, HAVE YOU WISHED YOU WERE DEAD OR WISHED YOU COULD GO TO SLEEP AND NOT WAKE UP?: NO

## 2024-10-18 ASSESSMENT — LIFESTYLE VARIABLES
EVER FELT BAD OR GUILTY ABOUT YOUR DRINKING: NO
HAVE PEOPLE ANNOYED YOU BY CRITICIZING YOUR DRINKING: NO
EVER HAD A DRINK FIRST THING IN THE MORNING TO STEADY YOUR NERVES TO GET RID OF A HANGOVER: NO
HAVE YOU EVER FELT YOU SHOULD CUT DOWN ON YOUR DRINKING: NO
TOTAL SCORE: 0

## 2024-10-18 ASSESSMENT — PAIN DESCRIPTION - PAIN TYPE: TYPE: ACUTE PAIN

## 2024-10-18 ASSESSMENT — PAIN - FUNCTIONAL ASSESSMENT: PAIN_FUNCTIONAL_ASSESSMENT: 0-10

## 2024-10-18 ASSESSMENT — PAIN SCALES - GENERAL: PAINLEVEL_OUTOF10: 8

## 2024-10-18 NOTE — ED PROVIDER NOTES
EMERGENCY MEDICINE EVALUATION NOTE    History of Present Illness     Chief Complaint:   Chief Complaint   Patient presents with    headache/ congestion / cough       HPI: Ashley Chu is a 39 y.o. female presents with a chief complaint of multiple medical complaints.  All of her primary consistent around a URI.  Patient reports that she has a cough that is occasionally productive.  She states that she has wheezing but she notes as well.  States she has a little body aches as well as some nasal congestion and headache.  Patient reports that she is unsure of any sick contacts.  She states that she has no significant history of any COPD.  Patient reports that she came in today because she feels like her inhalers at home were not improving her wheezing.  Patient denies any significant fevers.  She denies any current chest pain.  Patient reports he continues to smoke about half pack a day.    Previous History     Past Medical History:   Diagnosis Date    Acute upper respiratory infection, unspecified 03/17/2016    Acute upper respiratory infection    Body mass index (BMI) 38.0-38.9, adult     BMI 38.0-38.9,adult    Candidiasis, unspecified 04/25/2017    Yeast infection    Cellulitis of right finger 02/05/2020    Paronychia of finger of right hand    Diseases of the respiratory system complicating pregnancy, unspecified trimester ([facility]) 10/24/2022    Asthma affecting pregnancy, antepartum    Essential (primary) hypertension 08/21/2019    Essential hypertension with goal blood pressure less than 130/80    Ingrowing nail 02/05/2020    Ingrowing nail with infection    Localized edema 06/21/2016    Leg edema, left    Obesity complicating pregnancy, first trimester ([facility]) 11/28/2017    Obesity during pregnancy in first trimester    Other conditions influencing health status 02/08/2019    History of cough    Personal history of gestational diabetes     History of gestational diabetes    Personal history of  gestational diabetes 2019    History of gestational diabetes mellitus (GDM)    Personal history of other diseases of the female genital tract 2015    History of amenorrhea    Personal history of other diseases of the respiratory system 2016    History of acute pharyngitis    Personal history of other diseases of the respiratory system 2019    History of acute bacterial sinusitis    Personal history of other diseases of urinary system 2014    History of hematuria    Personal history of other specified conditions 2015    History of nausea    Personal history of other specified conditions 2014    History of flank pain    Supervision of pregnancy with other poor reproductive or obstetric history, first trimester ([facility]) 10/24/2022    History of gestational diabetes in prior pregnancy, currently pregnant, first trimester    Unspecified maternal hypertension, first trimester ([facility]) 2017    Hypertension during pregnancy in first trimester    Urinary tract infection, site not specified 2014    Recurrent UTI     Past Surgical History:   Procedure Laterality Date    OTHER SURGICAL HISTORY  2020     section    OTHER SURGICAL HISTORY  2020    Hernia repair    OTHER SURGICAL HISTORY  2020    Laparoscopic endometrioma fulguration     Social History     Tobacco Use    Smoking status: Every Day     Types: Cigarettes    Smokeless tobacco: Never   Vaping Use    Vaping status: Never Used   Substance Use Topics    Alcohol use: Yes     Comment: socially    Drug use: Never     Family History   Problem Relation Name Age of Onset    Diabetes Mother      Diabetes Brother       Allergies   Allergen Reactions    Metformin Diarrhea     Current Outpatient Medications   Medication Instructions    albuterol 2.5 mg /3 mL (0.083 %) nebulizer solution inhalation, Every 8 hours    albuterol 2.5 mg /3 mL (0.083 %) nebulizer solution inhalation    amLODIPine (Norvasc)  5 mg tablet 1 tablet, oral, Every 12 hours    ARIPiprazole (ABILIFY) 2 mg, oral, Nightly    atorvastatin (LIPITOR) 20 mg, oral, Daily    dicyclomine (BENTYL) 10 mg, oral, 4 times daily    docusate sodium (COLACE) 100 mg, oral, Daily    doxycycline (VIBRAMYCIN) 100 mg, oral, 2 times daily, Take with at least 8 ounces (large glass) of water, do not lie down for 30 minutes after    dulaglutide (TRULICITY) 1.5 mg, subcutaneous, Once Weekly    FreeStyle Mic 2 Bandana misc USE AS DIRECTED    FreeStyle Mic 2 Sensor kit USE AS DIRECTED    Gvoke HypoPen 2-Pack 1 mg, subcutaneous, As needed    labetalol (Normodyne) 200 mg tablet 1 tablet, oral, Every 12 hours    Lantus Solostar U-100 Insulin 15 Units, subcutaneous, Nightly    losartan (Cozaar) 50 mg tablet 1 tablet, oral, Every 12 hours scheduled (0630,1830)    melatonin 10 mg, oral, Nightly PRN    ondansetron (ZOFRAN) 4 mg, oral, 3 times daily    predniSONE (DELTASONE) 50 mg, oral, Daily    senna 8.6 mg tablet 2 tablets, oral, As needed    sertraline (ZOLOFT) 100 mg, oral, Daily    tiZANidine (ZANAFLEX) 2 mg, oral, Every 6 hours PRN    topiramate (TOPAMAX) 50 mg, oral, Daily    Trulicity 0.75 mg, subcutaneous, Once Weekly    Ventolin HFA 90 mcg/actuation inhaler INHALE 2 PUFFS BY MOUTH EVERY 4 HOURS AS NEEDED FOR ASTHMA       Physical Exam     Appearance: Alert, oriented , cooperative     Skin: Intact,  dry skin, no lesions, rash, petechiae or purpura.      Eyes: PERRLA, EOMs intact,  Conjunctiva pink      ENT: Hearing grossly intact. Pharynx clear, uvula midline.      Neck: Supple. Trachea at midline. No lymphadenopathy.     Pulmonary: Wheezing throughout all lung fields.  No accessory muscle use or stridor.     Cardiac: Normal rate and rhythm without murmur     Abdomen: Soft, nontender, active bowel sounds.     Musculoskeletal: Full range of motion.      Neurological:Cranial nerves II through XII are grossly intact, normal sensation, no weakness, no focal findings  identified.     Results     Labs Reviewed   CBC WITH AUTO DIFFERENTIAL - Abnormal       Result Value    WBC 11.5 (*)     nRBC 0.0      RBC 5.68 (*)     Hemoglobin 13.0      Hematocrit 39.8      MCV 70 (*)     MCH 22.9 (*)     MCHC 32.7      RDW 15.9 (*)     Platelets 295      Neutrophils % 61.5      Immature Granulocytes %, Automated 1.1 (*)     Lymphocytes % 24.7      Monocytes % 7.2      Eosinophils % 4.5      Basophils % 1.0      Neutrophils Absolute 7.08      Immature Granulocytes Absolute, Automated 0.13      Lymphocytes Absolute 2.84      Monocytes Absolute 0.83      Eosinophils Absolute 0.52      Basophils Absolute 0.12 (*)    COMPREHENSIVE METABOLIC PANEL - Abnormal    Glucose 112 (*)     Sodium 134 (*)     Potassium 3.9      Chloride 106      Bicarbonate 22      Anion Gap 10      Urea Nitrogen 9      Creatinine 0.68      eGFR >90      Calcium 9.3      Albumin 4.6      Alkaline Phosphatase 79      Total Protein 7.5      AST 12      Bilirubin, Total 0.5      ALT 21     LACTATE - Normal    Lactate 1.0      Narrative:     Venipuncture immediately after or during the administration of Metamizole may lead to falsely low results. Testing should be performed immediately prior to Metamizole dosing.   SARS-COV-2 PCR - Normal    Coronavirus 2019, PCR Not Detected      Narrative:     This assay has received FDA Emergency Use Authorization (EUA) and is only authorized for the duration of time that circumstances exist to justify the authorization of the emergency use of in vitro diagnostic tests for the detection of SARS-CoV-2 virus and/or diagnosis of COVID-19 infection under section 564(b)(1) of the Act, 21 U.S.C. 360bbb-3(b)(1). This assay is an in vitro diagnostic nucleic acid amplification test for the qualitative detection of SARS-CoV-2 from nasopharyngeal specimens and has been validated for use at [facility]. Negative results do not preclude COVID-19 infections and should not be used as  "the sole basis for diagnosis, treatment, or other management decisions.     INFLUENZA A AND B PCR - Normal    Flu A Result Not Detected      Flu B Result Not Detected      Narrative:     This assay is an in vitro diagnostic multiplex nucleic acid amplification test for the detection and discrimination of Influenza A & B from nasopharyngeal specimens, and has been validated for use at [facility]. Negative results do not preclude Influenza A/B infections, and should not be used as the sole basis for diagnosis, treatment, or other management decisions. If Influenza A/B and RSV PCR results are negative, testing for Parainfluenza virus, Adenovirus and Metapneumovirus is routinely performed for [facility] pediatric oncology and intensive care inpatients, and is available on other patients by placing an add-on request.     XR chest 2 views   Final Result   No acute process.   Signed by Randy Flynn MD            ED Course & Medical Decision Making     Medications   ipratropium-albuteroL (Duo-Neb) 0.5-2.5 mg/3 mL nebulizer solution 3 mL (3 mL nebulization Given 10/18/24 1058)   methylPREDNISolone sod succinate (SOLU-Medrol) injection 125 mg (125 mg intravenous Given 10/18/24 1039)   acetaminophen (Tylenol) tablet 975 mg (975 mg oral Given 10/18/24 1047)     Heart Rate:  [84]   Temperature:  [36.9 °C (98.5 °F)]   Respirations:  [20]   BP: (152)/(98)   Height:  [165.1 cm (5' 5\")]   Weight:  [83 kg (183 lb)]   Pulse Ox:  [98 %]    ED Course as of 10/18/24 1209   Fri Oct 18, 2024   1207 Updated the patient on the results.  Patient's workup did not show any specific abnormalities today.  Patient did have a trace leukocytosis but no left shift.  Patient's CMP was within normal limits.  Lactate was negative.  COVID and flu were within normal limits.  Patient was reassessed and her wheezing was improved with receiving breathing treatments and steroids here in the emergency department.  Secondary to patient's " smoking status she will receive antibiotics despite her chest x-ray being normal.  Patient we discharged home with doxycycline as well as prednisone.  Patient does have an inhaler at home.  She was encouraged to follow-up with her primary care provider in 1 to 2 days or return here immediately with any worsening symptoms. [CJ]      ED Course User Index  [CJ] Jorge A Sheffield PA-C         Diagnoses as of 10/18/24 1209   Acute cough       Procedures   Procedures    Diagnosis     1. Acute cough        Disposition   Discharged    ED Prescriptions       Medication Sig Dispense Start Date End Date Auth. Provider    doxycycline (Vibramycin) 100 mg capsule Take 1 capsule (100 mg) by mouth 2 times a day for 10 days. Take with at least 8 ounces (large glass) of water, do not lie down for 30 minutes after 20 capsule 10/18/2024 10/28/2024 Jorge A Sheffield PA-C    predniSONE (Deltasone) 50 mg tablet Take 1 tablet (50 mg) by mouth once daily for 5 days. 5 tablet 10/18/2024 10/23/2024 Jorge A Sheffield PA-C            Disclaimer: This note was dictated by speech recognition. Minor errors in transcription may be present. Please call if questions.       Jorge A Sheffield PA-C  10/18/24 1208

## 2024-10-21 ENCOUNTER — PREP FOR PROCEDURE (OUTPATIENT)
Dept: ORTHOPEDIC SURGERY | Facility: HOSPITAL | Age: 39
End: 2024-10-21

## 2024-10-21 ENCOUNTER — OFFICE VISIT (OUTPATIENT)
Dept: ORTHOPEDIC SURGERY | Facility: HOSPITAL | Age: 39
End: 2024-10-21
Payer: COMMERCIAL

## 2024-10-21 VITALS — WEIGHT: 183 LBS | HEIGHT: 65 IN | BODY MASS INDEX: 30.49 KG/M2

## 2024-10-21 DIAGNOSIS — G56.01 CARPAL TUNNEL SYNDROME ON RIGHT: Primary | ICD-10-CM

## 2024-10-21 PROCEDURE — 3049F LDL-C 100-129 MG/DL: CPT | Performed by: ORTHOPAEDIC SURGERY

## 2024-10-21 PROCEDURE — 3052F HG A1C>EQUAL 8.0%<EQUAL 9.0%: CPT | Performed by: ORTHOPAEDIC SURGERY

## 2024-10-21 PROCEDURE — 3060F POS MICROALBUMINURIA REV: CPT | Performed by: ORTHOPAEDIC SURGERY

## 2024-10-21 PROCEDURE — 99214 OFFICE O/P EST MOD 30 MIN: CPT | Performed by: ORTHOPAEDIC SURGERY

## 2024-10-21 PROCEDURE — 3008F BODY MASS INDEX DOCD: CPT | Performed by: ORTHOPAEDIC SURGERY

## 2024-10-21 PROCEDURE — 4010F ACE/ARB THERAPY RXD/TAKEN: CPT | Performed by: ORTHOPAEDIC SURGERY

## 2024-10-21 NOTE — PROGRESS NOTES
39 y.o. female presents today for follow-up of right hand numbness, tingling, weakness and pain. The patient reports symptoms for months, getting worse.  Pain is controlled.  Reports no previous surgeries, injections or trauma to the area.  Reports pain worse with use, better at rest.  Pain numb and tingly, wakes them from sleep.   Worse driving a car and talking on a phone.    she did have a shot for what appears to be de Quervain's many years ago.  Got EMG.  Night splints helped some but not enough and would like surgery.    Review of Systems    Constitutional: see HPI, no fever, no chills, not feeling tired, no significant weight gain or weight loss.   Eyes: No vision changes  ENT: no nosebleeds.   Cardiovascular: no chest pain.   Respiratory: no shortness of breath and no cough.   Gastrointestinal: no abdominal pain, no nausea, no vomiting and no diarrhea.   Musculoskeletal: per HPI  Neurological: no headache, no gait disturbances  Psychiatric: no depression and no sleep disturbances.   Endocrine: no muscle weakness and no muscle cramps.   Hematologic/Lymphatic: no swollen glands and no tendency for easy bruising or excessive swelling.     Patient's past medical history, past surgical history, allergies, and medications have been reviewed unless otherwise noted in the chart.     Carpal Tunnel Exam  Inspection:  no evidence of infection, no edema, no erythema, no ecchymosis, Palpation:  compartments are soft, no pain with palpation, Range of Motion:  full wrist and finger range of motion, Stability:  no wrist instability detected, Strength:  5/5 APB and intrinsics, Skin:  intact, Vascular:  capillary refill <2 seconds distally, Sensation:  decreased in the median nerve distribution, Test:  positive Tinel's at the Carpal Tunnel, positive Direct Carpal Tunnel Compression Test      Constitutional   General appearance: Alert and in no acute distress. Well developed, well nourished.    Eyes   External Eye, Conjunctiva  and lids: Normal external exam - pupils were equal in size, round, reactive to light (PERRL) with normal accommodation and extraocular movements intact (EOMI).   Ears, Nose, Mouth, and Throat   Hearing: Normal.   Neck   Neck: No neck mass was observed. Supple.   Pulmonary   Respiratory effort: No respiratory distress.   Cardiovascular   Examination of extremities: No peripheral edema.   Psychiatric   Judgment and insight: Intact.   Orientation to person, place, and time: Alert and oriented x 3.       Mood and affect: Normal.      XR - no fractures, no dislocations, or no osseous abnormalities  right wrist    X-rays were personally reviewed by me. Radiology reports were reviewed by me as well, if available at the time.      EMG shows mild carpal tunnel on the right      Right carpal tunnel syndrome  Surgery discussion: I discussed the diagnosis and treatment options with the patient today along with their associated risks and benefits. After thorough discussion, the patient has elected to proceed with surgical intervention. The patient understands the risks of,including, but not limited to, bleeding, infection, loss of life or limb, pain, permanent numbness, tingling, weakness, loss of motion, failure of the goals of surgery or the potential need for additional surgery. The patient would like to accept these risks and proceed. All questions were answered to the patients satisfaction and the patient seems satisfied with the plan.    Plan right endoscopic carpal tunnel release  Follow-up 2 weeks after no x-ray

## 2024-10-23 DIAGNOSIS — Z01.818 PRE-OP EVALUATION: Primary | ICD-10-CM

## 2024-10-23 DIAGNOSIS — E11.69 TYPE 2 DIABETES MELLITUS WITH OTHER SPECIFIED COMPLICATION, WITH LONG-TERM CURRENT USE OF INSULIN: ICD-10-CM

## 2024-10-23 DIAGNOSIS — Z79.4 TYPE 2 DIABETES MELLITUS WITH OTHER SPECIFIED COMPLICATION, WITH LONG-TERM CURRENT USE OF INSULIN: ICD-10-CM

## 2024-10-23 DIAGNOSIS — F17.200 SMOKER: ICD-10-CM

## 2024-10-29 ENCOUNTER — APPOINTMENT (OUTPATIENT)
Dept: ENDOCRINOLOGY | Facility: CLINIC | Age: 39
End: 2024-10-29
Payer: COMMERCIAL

## 2024-12-16 ENCOUNTER — ANESTHESIA EVENT (OUTPATIENT)
Dept: OPERATING ROOM | Facility: HOSPITAL | Age: 39
End: 2024-12-16
Payer: COMMERCIAL

## 2024-12-18 ENCOUNTER — PRE-ADMISSION TESTING (OUTPATIENT)
Dept: PREADMISSION TESTING | Facility: HOSPITAL | Age: 39
End: 2024-12-18
Payer: COMMERCIAL

## 2024-12-18 VITALS
RESPIRATION RATE: 18 BRPM | HEART RATE: 78 BPM | SYSTOLIC BLOOD PRESSURE: 132 MMHG | DIASTOLIC BLOOD PRESSURE: 88 MMHG | TEMPERATURE: 98.6 F

## 2024-12-18 DIAGNOSIS — E11.69 TYPE 2 DIABETES MELLITUS WITH OTHER SPECIFIED COMPLICATION, WITH LONG-TERM CURRENT USE OF INSULIN: ICD-10-CM

## 2024-12-18 DIAGNOSIS — G56.01 CARPAL TUNNEL SYNDROME ON RIGHT: ICD-10-CM

## 2024-12-18 DIAGNOSIS — Z79.4 TYPE 2 DIABETES MELLITUS WITH OTHER SPECIFIED COMPLICATION, WITH LONG-TERM CURRENT USE OF INSULIN: ICD-10-CM

## 2024-12-18 DIAGNOSIS — Z01.818 PRE-OP EVALUATION: Primary | ICD-10-CM

## 2024-12-18 LAB
ANION GAP SERPL CALC-SCNC: 14 MMOL/L (ref 10–20)
ATRIAL RATE: 80 BPM
BUN SERPL-MCNC: 11 MG/DL (ref 6–23)
CALCIUM SERPL-MCNC: 8.9 MG/DL (ref 8.6–10.3)
CHLORIDE SERPL-SCNC: 102 MMOL/L (ref 98–107)
CO2 SERPL-SCNC: 20 MMOL/L (ref 21–32)
CREAT SERPL-MCNC: 0.67 MG/DL (ref 0.5–1.05)
EGFRCR SERPLBLD CKD-EPI 2021: >90 ML/MIN/1.73M*2
ERYTHROCYTE [DISTWIDTH] IN BLOOD BY AUTOMATED COUNT: 15.9 % (ref 11.5–14.5)
GLUCOSE SERPL-MCNC: 158 MG/DL (ref 74–99)
HCT VFR BLD AUTO: 39.7 % (ref 36–46)
HGB BLD-MCNC: 13 G/DL (ref 12–16)
MCH RBC QN AUTO: 22.6 PG (ref 26–34)
MCHC RBC AUTO-ENTMCNC: 32.7 G/DL (ref 32–36)
MCV RBC AUTO: 69 FL (ref 80–100)
NRBC BLD-RTO: 0 /100 WBCS (ref 0–0)
P AXIS: 47 DEGREES
PLATELET # BLD AUTO: 279 X10*3/UL (ref 150–450)
POTASSIUM SERPL-SCNC: 3.7 MMOL/L (ref 3.5–5.3)
PR INTERVAL: 151 MS
Q ONSET: 251 MS
QRS COUNT: 13 BEATS
QRS DURATION: 93 MS
QT INTERVAL: 402 MS
QTC CALCULATION(BAZETT): 464 MS
QTC FREDERICIA: 442 MS
R AXIS: 33 DEGREES
RBC # BLD AUTO: 5.74 X10*6/UL (ref 4–5.2)
SODIUM SERPL-SCNC: 132 MMOL/L (ref 136–145)
T AXIS: 38 DEGREES
T OFFSET: 452 MS
VENTRICULAR RATE: 80 BPM
WBC # BLD AUTO: 11.9 X10*3/UL (ref 4.4–11.3)

## 2024-12-18 PROCEDURE — 93005 ELECTROCARDIOGRAM TRACING: CPT

## 2024-12-18 PROCEDURE — 93010 ELECTROCARDIOGRAM REPORT: CPT | Performed by: INTERNAL MEDICINE

## 2024-12-18 PROCEDURE — 36415 COLL VENOUS BLD VENIPUNCTURE: CPT

## 2024-12-18 PROCEDURE — 80048 BASIC METABOLIC PNL TOTAL CA: CPT

## 2024-12-18 PROCEDURE — 85027 COMPLETE CBC AUTOMATED: CPT

## 2024-12-18 ASSESSMENT — DUKE ACTIVITY SCORE INDEX (DASI)
CAN YOU PARTICIPATE IN MODERATE RECREATIONAL ACTIVITIES LIKE GOLF, BOWLING, DANCING, DOUBLES TENNIS OR THROWING A BASEBALL OR FOOTBALL: YES
CAN YOU DO MODERATE WORK AROUND THE HOUSE LIKE VACUUMING, SWEEPING FLOORS OR CARRYING GROCERIES: YES
CAN YOU TAKE CARE OF YOURSELF (EAT, DRESS, BATHE, OR USE TOILET): YES
CAN YOU CLIMB A FLIGHT OF STAIRS OR WALK UP A HILL: YES
CAN YOU WALK INDOORS, SUCH AS AROUND YOUR HOUSE: YES
CAN YOU DO HEAVY WORK AROUND THE HOUSE LIKE SCRUBBING FLOORS OR LIFTING AND MOVING HEAVY FURNITURE: YES
CAN YOU DO LIGHT WORK AROUND THE HOUSE LIKE DUSTING OR WASHING DISHES: YES
CAN YOU WALK A BLOCK OR TWO ON LEVEL GROUND: YES
CAN YOU PARTICIPATE IN STRENOUS SPORTS LIKE SWIMMING, SINGLES TENNIS, FOOTBALL, BASKETBALL, OR SKIING: YES
CAN YOU DO YARD WORK LIKE RAKING LEAVES, WEEDING OR PUSHING A MOWER: YES

## 2024-12-18 ASSESSMENT — ENCOUNTER SYMPTOMS
EYES NEGATIVE: 1
ALLERGIC/IMMUNOLOGIC NEGATIVE: 1
MYALGIAS: 1
GASTROINTESTINAL NEGATIVE: 1
ENDOCRINE NEGATIVE: 1
CONSTITUTIONAL NEGATIVE: 1
ARTHRALGIAS: 1
PSYCHIATRIC NEGATIVE: 1
WEAKNESS: 1
HEMATOLOGIC/LYMPHATIC NEGATIVE: 1
RESPIRATORY NEGATIVE: 1
CARDIOVASCULAR NEGATIVE: 1

## 2024-12-18 ASSESSMENT — LIFESTYLE VARIABLES: SMOKING_STATUS: SMOKER

## 2024-12-18 NOTE — PREPROCEDURE INSTRUCTIONS
Medication List            Accurate as of December 18, 2024  9:02 AM. Always use your most recent med list.                * Ventolin HFA 90 mcg/actuation inhaler  Generic drug: albuterol  Medication Adjustments for Surgery: Take on the morning of surgery  Notes to patient: Use on morning of procedure if needed     * albuterol 2.5 mg /3 mL (0.083 %) nebulizer solution  Medication Adjustments for Surgery: Take on the morning of surgery  Notes to patient: Take on morning of procedure if needed     * albuterol 2.5 mg /3 mL (0.083 %) nebulizer solution     amLODIPine 5 mg tablet  Commonly known as: Norvasc  Medication Adjustments for Surgery: Take on the morning of surgery     ARIPiprazole 2 mg tablet  Commonly known as: Abilify  Medication Adjustments for Surgery: Take last dose 1 day (24 hours) before surgery     atorvastatin 20 mg tablet  Commonly known as: Lipitor  Medication Adjustments for Surgery: Take last dose 1 day (24 hours) before surgery     dicyclomine 10 mg capsule  Commonly known as: Bentyl  Take 1 capsule (10 mg) by mouth 4 times a day.     docusate sodium 100 mg capsule  Commonly known as: Colace     FreeStyle Mic 2 [place] misc  Generic drug: flash glucose scanning reader     FreeStyle Mic 2 Sensor kit  Generic drug: flash glucose sensor kit     Gvoke HypoPen 2-Pack 1 mg/0.2 mL auto-injector  Generic drug: glucagon     labetalol 200 mg tablet  Commonly known as: Normodyne  Medication Adjustments for Surgery: Take on the morning of surgery     Lantus Solostar U-100 Insulin 100 unit/mL (3 mL) pen  Generic drug: insulin glargine  Medication Adjustments for Surgery: Take last dose 1 day (24 hours) before surgery     losartan 50 mg tablet  Commonly known as: Cozaar  Medication Adjustments for Surgery: Take on the morning of surgery     melatonin 5 mg capsule  Medication Adjustments for Surgery: Take last dose 1 day (24 hours) before surgery     ondansetron 4 mg tablet  Commonly known as: Zofran  Take  1 tablet (4 mg) by mouth 3 times a day.  Medication Adjustments for Surgery: Take on the morning of surgery     senna 8.6 mg tablet  Generic drug: sennosides     sertraline 100 mg tablet  Commonly known as: Zoloft  Medication Adjustments for Surgery: Take last dose 1 day (24 hours) before surgery     tiZANidine 2 mg tablet  Commonly known as: Zanaflex  Take 1 tablet (2 mg) by mouth every 6 hours if needed for muscle spasms for up to 10 days.     topiramate 50 mg tablet  Commonly known as: Topamax  Medication Adjustments for Surgery: Take last dose 1 day (24 hours) before surgery     * Trulicity 0.75 mg/0.5 mL pen injector  Generic drug: dulaglutide  Notes to patient: Stopped on 12/16     * dulaglutide 1.5 mg/0.5 mL pen injector injection  Commonly known as: Trulicity  Inject 1.5 mg under the skin 1 (one) time per week.  Medication Adjustments for Surgery: Take last dose 1 day (24 hours) before surgery           * This list has 5 medication(s) that are the same as other medications prescribed for you. Read the directions carefully, and ask your doctor or other care provider to review them with you.                                  NPO Instructions:    Do not eat any food after midnight the night before your surgery/procedure.    Additional Instructions:     The Day before Surgery:  Review your medication instructions, stop indicated medications  You will be contacted regarding the time of your arrival to facility and surgery time  Do not eat any food after Midnight  You will be contacted as to your arrival time. Usually between 2-4 pm the day prior   .  Go to admitting on arrival to the hospital, bring ID and insurance card  Leave all valuables at home  Take off any piercings  Need a  to take you home  Nothing to eat after midnight, we want to drink 12 ounces of water 1 hr prior to arrival time  Dont use any lotions, powders after showering  Take meds as  instructed.

## 2024-12-18 NOTE — H&P
History Of Present Illness  Ashley Chu is a very pleasant 39 y.o. female presenting with right hand/wrist CTS syndrome. Scheduled for Right CTS repair under MAC anesthesia per Dr. Cuello on 12/24/24.      Past Medical History  Past Medical History:   Diagnosis Date    Acute upper respiratory infection, unspecified 03/17/2016    Acute upper respiratory infection    Body mass index (BMI) 38.0-38.9, adult     BMI 38.0-38.9,adult    Candidiasis, unspecified 04/25/2017    Yeast infection    Cellulitis of right finger 02/05/2020    Paronychia of finger of right hand    Diseases of the respiratory system complicating pregnancy, unspecified trimester ([facility]) 10/24/2022    Asthma affecting pregnancy, antepartum    Essential (primary) hypertension 08/21/2019    Essential hypertension with goal blood pressure less than 130/80    Ingrowing nail 02/05/2020    Ingrowing nail with infection    Localized edema 06/21/2016    Leg edema, left    Obesity complicating pregnancy, first trimester ([facility]) 11/28/2017    Obesity during pregnancy in first trimester    Other conditions influencing health status 02/08/2019    History of cough    Personal history of gestational diabetes     History of gestational diabetes    Personal history of gestational diabetes 02/08/2019    History of gestational diabetes mellitus (GDM)    Personal history of other diseases of the female genital tract 11/11/2015    History of amenorrhea    Personal history of other diseases of the respiratory system 03/11/2016    History of acute pharyngitis    Personal history of other diseases of the respiratory system 08/21/2019    History of acute bacterial sinusitis    Personal history of other diseases of urinary system 03/18/2014    History of hematuria    Personal history of other specified conditions 11/11/2015    History of nausea    Personal history of other specified conditions 02/18/2014    History of flank pain    Supervision of pregnancy with other  poor reproductive or obstetric history, first trimester ([facility]) 10/24/2022    History of gestational diabetes in prior pregnancy, currently pregnant, first trimester    Unspecified maternal hypertension, first trimester ([facility]) 2017    Hypertension during pregnancy in first trimester    Urinary tract infection, site not specified 2014    Recurrent UTI       Surgical History  Past Surgical History:   Procedure Laterality Date    OTHER SURGICAL HISTORY  2020     section    OTHER SURGICAL HISTORY  2020    Hernia repair    OTHER SURGICAL HISTORY  2020    Laparoscopic endometrioma fulguration        Social History  She reports that she has been smoking cigarettes. She has never used smokeless tobacco. She reports current alcohol use. She reports that she does not use drugs.    Family History  Family History   Problem Relation Name Age of Onset    Diabetes Mother      Diabetes Brother          Allergies  Metformin    Review of Systems   Constitutional: Negative.    HENT: Negative.     Eyes: Negative.    Respiratory: Negative.     Cardiovascular: Negative.    Gastrointestinal: Negative.    Endocrine: Negative.    Genitourinary: Negative.    Musculoskeletal:  Positive for arthralgias and myalgias.        Right CTS   Skin: Negative.    Allergic/Immunologic: Negative.    Neurological:  Positive for weakness.        Right hand/wrist CTS  Patient states she's right hand dominant.   C/o numbness/tingling/loss of strength.    Hematological: Negative.    Psychiatric/Behavioral: Negative.     All other systems reviewed and are negative.       Physical Exam  Vitals and nursing note reviewed.   Constitutional:       Appearance: Normal appearance.   HENT:      Head: Normocephalic.      Nose: Nose normal.      Mouth/Throat:      Comments:   Mallampati: 3  TMD: >3  Finger breadth: 3  Dentition:  WNL  Neck ROM: full   Eyes:      Pupils: Pupils are equal, round, and reactive to light.    Cardiovascular:      Rate and Rhythm: Normal rate and regular rhythm.      Heart sounds: Normal heart sounds, S1 normal and S2 normal.   Pulmonary:      Effort: Pulmonary effort is normal.      Breath sounds: Normal breath sounds.      Comments: Lungs clear throughout all fields.   Abdominal:      General: Bowel sounds are normal.      Palpations: Abdomen is soft.      Comments: Bowel sounds active x4 quads    Musculoskeletal:         General: Normal range of motion.      Cervical back: Normal range of motion.      Right lower leg: No edema.      Left lower leg: No edema.      Comments: Right hand/wrist CTS    Skin:     General: Skin is warm and dry.   Neurological:      General: No focal deficit present.      Mental Status: She is alert and oriented to person, place, and time.   Psychiatric:         Mood and Affect: Mood normal.         Behavior: Behavior normal.         Thought Content: Thought content normal.         Judgment: Judgment normal.          Last Recorded Vitals  Blood pressure 132/88, pulse 78, temperature 37 °C (98.6 °F), temperature source Temporal, resp. rate 18.    Visit Vitals  /88   Pulse 78   Temp 37 °C (98.6 °F) (Temporal)   Resp 18   OB Status Implant   Smoking Status Every Day       DASI Risk Score      Flowsheet Row Pre-Admission Testing from 12/18/2024 in  [facility]   Can you take care of yourself (eat, dress, bathe, or use toilet)?  2.75 filed at 12/18/2024 0902   Can you walk indoors, such as around your house? 1.75 filed at 12/18/2024 0902   Can you walk a block or two on level ground?  2.75 filed at 12/18/2024 0902   Can you climb a flight of stairs or walk up a hill? 5.5 filed at 12/18/2024 0902   Can you do light work around the house like dusting or washing dishes? 2.7 filed at 12/18/2024 0902   Can you do moderate work around the house like vacuuming, sweeping floors or carrying groceries? 3.5 filed at 12/18/2024 0902   Can you do heavy work around the house  like scrubbing floors or lifting and moving heavy furniture?  8 filed at 12/18/2024 0902   Can you do yard work like raking leaves, weeding or pushing a mower? 4.5 filed at 12/18/2024 0902   Can you participate in moderate recreational activities like golf, bowling, dancing, doubles tennis or throwing a baseball or football? 6 filed at 12/18/2024 0902   Can you participate in strenous sports like swimming, singles tennis, football, basketball, or skiing? 7.5 filed at 12/18/2024 0902          Caprini DVT Assessment      Flowsheet Row Pre-Admission Testing from 12/18/2024 in  [facility]   DVT Score 2 filed at 12/18/2024 1028   Surgical Factors Minor surgery planned filed at 12/18/2024 1028   BMI 30 or less filed at 12/18/2024 1028          Modified Frailty Index    No data to display       CHADS2 Stroke Risk  Current as of 32 minutes ago        N/A 3 to 100%: High Risk   2 to < 3%: Medium Risk   0 to < 2%: Low Risk     Last Change: N/A          This score determines the patient's risk of having a stroke if the patient has atrial fibrillation.        This score is not applicable to this patient. Components are not calculated.          Revised Cardiac Risk Index      Flowsheet Row Pre-Admission Testing from 12/18/2024 in  [facility]   High-Risk Surgery (Intraperitoneal, Intrathoracic,Suprainguinal vascular) 0 filed at 12/18/2024 1028   History of ischemic heart disease (History of MI, History of positive exercuse test, Current chest paint considered due to myocardial ischemia, Use of nitrate therapy, ECG with pathological Q Waves) 0 filed at 12/18/2024 1028   History of congestive heart failure (pulmonary edemia, bilateral rales or S3 gallop, Paroxysmal nocturnal dyspnea, CXR showing pulmonary vascular redistribution) 0 filed at 12/18/2024 1028   History of cerebrovascular disease (Prior TIA or stroke) 0 filed at 12/18/2024 1028   Pre-operative insulin treatment 0 filed at 12/18/2024 1028    Pre-operative creatinine>2 mg/dl 0 filed at 12/18/2024 1028   Revised Cardiac Risk Calculator 0 filed at 12/18/2024 1028          Apfel Simplified Score      Flowsheet Row Pre-Admission Testing from 12/18/2024 in  [facility]   Smoking status 0 filed at 12/18/2024 1028   History of motion sickness or PONV  0 filed at 12/18/2024 1028   Use of postoperative opioids 0 filed at 12/18/2024 1028   Gender - Female 1=Yes filed at 12/18/2024 1028   Apfel Simplified Score Calculator 1 filed at 12/18/2024 1028          Risk Analysis Index Results This Encounter    No data found in the last 10 encounters.       Stop Bang Score      Flowsheet Row Pre-Admission Testing from 12/18/2024 in  [facility]   Do you snore loudly? 0 filed at 12/18/2024 0901   Do you often feel tired or fatigued after your sleep? 1 filed at 12/18/2024 0901   Has anyone ever observed you stop breathing in your sleep? 0 filed at 12/18/2024 0901   Do you have or are you being treated for high blood pressure? 1 filed at 12/18/2024 0901   Recent BMI (Calculated) 30.5 filed at 12/18/2024 0901   Is BMI greater than 35 kg/m2? 0=No filed at 12/18/2024 0901   Age older than 50 years old? 0=No filed at 12/18/2024 0901   Is your neck circumference greater than 17 inches (Male) or 16 inches (Female)? 0 filed at 12/18/2024 0901   Gender - Male 0=No filed at 12/18/2024 0901   STOP-BANG Total Score 2 filed at 12/18/2024 0901          Prodigy: High Risk  Total Score: 0          ARISCAT Score for Postoperative Pulmonary Complications    No data to display       Ayala Perioperative Risk for Myocardial Infarction or Cardiac Arrest (DANIKA)    No data to display         Relevant Results  Results for orders placed or performed in visit on 12/18/24 (from the past 24 hours)   CBC   Result Value Ref Range    WBC 11.9 (H) 4.4 - 11.3 x10*3/uL    nRBC 0.0 0.0 - 0.0 /100 WBCs    RBC 5.74 (H) 4.00 - 5.20 x10*6/uL    Hemoglobin 13.0 12.0 - 16.0 g/dL     Hematocrit 39.7 36.0 - 46.0 %    MCV 69 (L) 80 - 100 fL    MCH 22.6 (L) 26.0 - 34.0 pg    MCHC 32.7 32.0 - 36.0 g/dL    RDW 15.9 (H) 11.5 - 14.5 %    Platelets 279 150 - 450 x10*3/uL   Basic Metabolic Panel   Result Value Ref Range    Glucose 158 (H) 74 - 99 mg/dL    Sodium 132 (L) 136 - 145 mmol/L    Potassium 3.7 3.5 - 5.3 mmol/L    Chloride 102 98 - 107 mmol/L    Bicarbonate 20 (L) 21 - 32 mmol/L    Anion Gap 14 10 - 20 mmol/L    Urea Nitrogen 11 6 - 23 mg/dL    Creatinine 0.67 0.50 - 1.05 mg/dL    eGFR >90 >60 mL/min/1.73m*2    Calcium 8.9 8.6 - 10.3 mg/dL               Assessment/Plan   Problem List Items Addressed This Visit       Type 2 diabetes mellitus, with long-term current use of insulin    Relevant Orders    Basic Metabolic Panel (Completed)    CBC (Completed)    ECG 12 Lead    Carpal tunnel syndrome on right     Other Visit Diagnoses       Pre-op evaluation    -  Primary    Relevant Orders    Basic Metabolic Panel (Completed)    CBC (Completed)    ECG 12 Lead          Scheduled for Right CTS repair under MAC anesthesia per Dr. Cuello on 12/24/24.   CBC, BMP ordered. Reviewed, . WBC 11.9 which is right around where patient normally runs.   EKG today shows SR, LVH, rate of 80 bpm.   H&P and airway assessment completed today.  Last dose of Trulicity 12/16/24.   Please take your amlodipine with a small sip of water the morning of surgery.   Ok to use inhalers any time.   All surgery instructions reviewed with patient by RN. Verbalized understanding.            Leilani Roberts, APRN-CNS

## 2024-12-24 ENCOUNTER — ANESTHESIA (OUTPATIENT)
Dept: OPERATING ROOM | Facility: HOSPITAL | Age: 39
End: 2024-12-24
Payer: COMMERCIAL

## 2024-12-24 ENCOUNTER — HOSPITAL ENCOUNTER (OUTPATIENT)
Facility: HOSPITAL | Age: 39
Setting detail: OUTPATIENT SURGERY
Discharge: HOME | End: 2024-12-24
Attending: ORTHOPAEDIC SURGERY | Admitting: ORTHOPAEDIC SURGERY
Payer: COMMERCIAL

## 2024-12-24 ENCOUNTER — PHARMACY VISIT (OUTPATIENT)
Dept: PHARMACY | Facility: CLINIC | Age: 39
End: 2024-12-24
Payer: MEDICAID

## 2024-12-24 VITALS
WEIGHT: 183 LBS | RESPIRATION RATE: 16 BRPM | OXYGEN SATURATION: 98 % | SYSTOLIC BLOOD PRESSURE: 144 MMHG | BODY MASS INDEX: 30.49 KG/M2 | DIASTOLIC BLOOD PRESSURE: 84 MMHG | HEART RATE: 90 BPM | TEMPERATURE: 98.5 F | HEIGHT: 65 IN

## 2024-12-24 DIAGNOSIS — G56.01 CARPAL TUNNEL SYNDROME ON RIGHT: Primary | ICD-10-CM

## 2024-12-24 PROBLEM — J45.909 ASTHMA: Status: ACTIVE | Noted: 2024-12-24

## 2024-12-24 PROBLEM — K21.9 GASTROESOPHAGEAL REFLUX DISEASE: Status: ACTIVE | Noted: 2024-12-24

## 2024-12-24 LAB
GLUCOSE BLD MANUAL STRIP-MCNC: 176 MG/DL (ref 74–99)
PREGNANCY TEST URINE, POC: NEGATIVE

## 2024-12-24 PROCEDURE — 2500000005 HC RX 250 GENERAL PHARMACY W/O HCPCS: Performed by: ORTHOPAEDIC SURGERY

## 2024-12-24 PROCEDURE — 82947 ASSAY GLUCOSE BLOOD QUANT: CPT

## 2024-12-24 PROCEDURE — 29848 WRIST ENDOSCOPY/SURGERY: CPT | Performed by: ORTHOPAEDIC SURGERY

## 2024-12-24 PROCEDURE — 3700000002 HC GENERAL ANESTHESIA TIME - EACH INCREMENTAL 1 MINUTE: Performed by: ORTHOPAEDIC SURGERY

## 2024-12-24 PROCEDURE — 3600000003 HC OR TIME - INITIAL BASE CHARGE - PROCEDURE LEVEL THREE: Performed by: ORTHOPAEDIC SURGERY

## 2024-12-24 PROCEDURE — 7100000009 HC PHASE TWO TIME - INITIAL BASE CHARGE: Performed by: ORTHOPAEDIC SURGERY

## 2024-12-24 PROCEDURE — 3700000001 HC GENERAL ANESTHESIA TIME - INITIAL BASE CHARGE: Performed by: ORTHOPAEDIC SURGERY

## 2024-12-24 PROCEDURE — 2500000001 HC RX 250 WO HCPCS SELF ADMINISTERED DRUGS (ALT 637 FOR MEDICARE OP): Performed by: ANESTHESIOLOGY

## 2024-12-24 PROCEDURE — 2500000004 HC RX 250 GENERAL PHARMACY W/ HCPCS (ALT 636 FOR OP/ED): Performed by: NURSE ANESTHETIST, CERTIFIED REGISTERED

## 2024-12-24 PROCEDURE — 3600000008 HC OR TIME - EACH INCREMENTAL 1 MINUTE - PROCEDURE LEVEL THREE: Performed by: ORTHOPAEDIC SURGERY

## 2024-12-24 PROCEDURE — 81025 URINE PREGNANCY TEST: CPT | Performed by: ANESTHESIOLOGY

## 2024-12-24 PROCEDURE — 2500000004 HC RX 250 GENERAL PHARMACY W/ HCPCS (ALT 636 FOR OP/ED): Performed by: ORTHOPAEDIC SURGERY

## 2024-12-24 PROCEDURE — 2500000005 HC RX 250 GENERAL PHARMACY W/O HCPCS: Performed by: ANESTHESIOLOGY

## 2024-12-24 PROCEDURE — 7100000010 HC PHASE TWO TIME - EACH INCREMENTAL 1 MINUTE: Performed by: ORTHOPAEDIC SURGERY

## 2024-12-24 PROCEDURE — 2500000004 HC RX 250 GENERAL PHARMACY W/ HCPCS (ALT 636 FOR OP/ED): Mod: JZ | Performed by: ORTHOPAEDIC SURGERY

## 2024-12-24 PROCEDURE — 2720000007 HC OR 272 NO HCPCS: Performed by: ORTHOPAEDIC SURGERY

## 2024-12-24 PROCEDURE — RXMED WILLOW AMBULATORY MEDICATION CHARGE

## 2024-12-24 PROCEDURE — 2500000004 HC RX 250 GENERAL PHARMACY W/ HCPCS (ALT 636 FOR OP/ED): Performed by: ANESTHESIOLOGY

## 2024-12-24 RX ORDER — ONDANSETRON HYDROCHLORIDE 2 MG/ML
4 INJECTION, SOLUTION INTRAVENOUS ONCE
Status: COMPLETED | OUTPATIENT
Start: 2024-12-24 | End: 2024-12-24

## 2024-12-24 RX ORDER — METOCLOPRAMIDE HYDROCHLORIDE 5 MG/ML
10 INJECTION INTRAMUSCULAR; INTRAVENOUS ONCE
Status: COMPLETED | OUTPATIENT
Start: 2024-12-24 | End: 2024-12-24

## 2024-12-24 RX ORDER — CEFAZOLIN SODIUM 2 G/100ML
2 INJECTION, SOLUTION INTRAVENOUS ONCE
Status: COMPLETED | OUTPATIENT
Start: 2024-12-24 | End: 2024-12-24

## 2024-12-24 RX ORDER — SODIUM CITRATE AND CITRIC ACID MONOHYDRATE 334; 500 MG/5ML; MG/5ML
30 SOLUTION ORAL ONCE
Status: COMPLETED | OUTPATIENT
Start: 2024-12-24 | End: 2024-12-24

## 2024-12-24 RX ORDER — HYDROCODONE BITARTRATE AND ACETAMINOPHEN 5; 325 MG/1; MG/1
1 TABLET ORAL EVERY 6 HOURS PRN
Qty: 10 TABLET | Refills: 0 | Status: SHIPPED | OUTPATIENT
Start: 2024-12-24

## 2024-12-24 RX ORDER — LIDOCAINE HCL/PF 100 MG/5ML
SYRINGE (ML) INTRAVENOUS AS NEEDED
Status: DISCONTINUED | OUTPATIENT
Start: 2024-12-24 | End: 2024-12-24

## 2024-12-24 RX ORDER — PROPOFOL 10 MG/ML
INJECTION, EMULSION INTRAVENOUS AS NEEDED
Status: DISCONTINUED | OUTPATIENT
Start: 2024-12-24 | End: 2024-12-24

## 2024-12-24 RX ORDER — HYDROMORPHONE HYDROCHLORIDE 0.2 MG/ML
0.2 INJECTION INTRAMUSCULAR; INTRAVENOUS; SUBCUTANEOUS EVERY 5 MIN PRN
Status: DISCONTINUED | OUTPATIENT
Start: 2024-12-24 | End: 2024-12-24 | Stop reason: HOSPADM

## 2024-12-24 RX ORDER — SODIUM CHLORIDE 9 MG/ML
20 INJECTION, SOLUTION INTRAVENOUS CONTINUOUS
Status: DISCONTINUED | OUTPATIENT
Start: 2024-12-24 | End: 2024-12-24 | Stop reason: HOSPADM

## 2024-12-24 RX ORDER — FAMOTIDINE 10 MG/ML
20 INJECTION INTRAVENOUS ONCE
Status: COMPLETED | OUTPATIENT
Start: 2024-12-24 | End: 2024-12-24

## 2024-12-24 RX ORDER — ALBUTEROL SULFATE 0.83 MG/ML
2.5 SOLUTION RESPIRATORY (INHALATION) ONCE
Status: DISCONTINUED | OUTPATIENT
Start: 2024-12-24 | End: 2024-12-24 | Stop reason: HOSPADM

## 2024-12-24 RX ORDER — BUPIVACAINE HCL/EPINEPHRINE 0.5-1:200K
VIAL (ML) INJECTION AS NEEDED
Status: DISCONTINUED | OUTPATIENT
Start: 2024-12-24 | End: 2024-12-24 | Stop reason: HOSPADM

## 2024-12-24 RX ORDER — LIDOCAINE HYDROCHLORIDE AND EPINEPHRINE 10; 20 UG/ML; MG/ML
INJECTION, SOLUTION INFILTRATION; PERINEURAL AS NEEDED
Status: DISCONTINUED | OUTPATIENT
Start: 2024-12-24 | End: 2024-12-24 | Stop reason: HOSPADM

## 2024-12-24 RX ORDER — ONDANSETRON HYDROCHLORIDE 2 MG/ML
4 INJECTION, SOLUTION INTRAVENOUS ONCE AS NEEDED
Status: DISCONTINUED | OUTPATIENT
Start: 2024-12-24 | End: 2024-12-24 | Stop reason: HOSPADM

## 2024-12-24 RX ORDER — FENTANYL CITRATE 50 UG/ML
INJECTION, SOLUTION INTRAMUSCULAR; INTRAVENOUS CONTINUOUS PRN
Status: DISCONTINUED | OUTPATIENT
Start: 2024-12-24 | End: 2024-12-24

## 2024-12-24 SDOH — HEALTH STABILITY: MENTAL HEALTH: CURRENT SMOKER: 0

## 2024-12-24 ASSESSMENT — COLUMBIA-SUICIDE SEVERITY RATING SCALE - C-SSRS
6. HAVE YOU EVER DONE ANYTHING, STARTED TO DO ANYTHING, OR PREPARED TO DO ANYTHING TO END YOUR LIFE?: NO
2. HAVE YOU ACTUALLY HAD ANY THOUGHTS OF KILLING YOURSELF?: NO
1. IN THE PAST MONTH, HAVE YOU WISHED YOU WERE DEAD OR WISHED YOU COULD GO TO SLEEP AND NOT WAKE UP?: NO

## 2024-12-24 ASSESSMENT — PAIN - FUNCTIONAL ASSESSMENT
PAIN_FUNCTIONAL_ASSESSMENT: 0-10

## 2024-12-24 ASSESSMENT — PAIN SCALES - GENERAL
PAINLEVEL_OUTOF10: 0 - NO PAIN
PAIN_LEVEL: 0
PAINLEVEL_OUTOF10: 0 - NO PAIN
PAINLEVEL_OUTOF10: 0 - NO PAIN

## 2024-12-24 NOTE — DISCHARGE INSTRUCTIONS
[facility]  [phone]   Fax: [phone] [address]   [address] [facility] -  [address] [place] [id number]    Upper Extremity - Endoscopic Carpal Tunnel Release    1. Dressing    You have a soft bandage over the operative site.  DO NOT GET DRESSING WET! Once at home, if your dressing feels too tight or mistakenly gets wet, please contact the office. This bandage can be removed in 48 hours and replaced in with Band-Aids as needed. After 48 hours you may wash your hands and shower, but no submerging.    If your sutures are visible (black strings), they will be removed about 10-14 days after surgery.  You should make an appointment to see your physician 10-14 after surgery.    2. Activity     Most people underestimate the length of time required to fully recover from surgery.  It is recommended you take some pain medication the evening following your surgery so when the local anesthetic wears off (in the middle of the night), you are not in severe pain.   With pain medication, start at the lowest dose that controls your pain.       After the first 1-3 days, we encourage you to balance your activity between ambulation, sitting in a chair, and resting in bed with your arm elevated. Let swelling and pain be your guide to activity, you should avoid prolonged (over 20 minutes) standing or sitting. In general, limit your activities to home for the first 1-3 days.    3. Pain    You will be discharged to home with a prescription for oral pain medication. A most important factor in pain control is rest and elevation. Ice may also be applied to the operative site for 30 minute intervals. Please use a waterproof bag for ice or cold packs.  Again, as you feel the numbness resolving and some onset of discomfort, please take pain medication, don't wait till full resolution of block.   Try to use the lowest dose of pain medication that controls your pain.       The pain medication may cause constipation. Drink plenty of fluids, eat fruits and vegetables. You may use an over the counter laxative or stool softener if necessary. Take pain medication with some food in your stomach, as prescribed by your pharmacist.     4. Elevation     Elevation of the operative extremity is critical. Elevation reduces swelling and minimizes pain. Less swelling is associated with a lower infectious rate, fewer wound complications, less post-operative stiffness, and more rapid recovery of function. To keep the swelling down, your hand must be kept above the level of your heart.        .

## 2024-12-24 NOTE — ANESTHESIA POSTPROCEDURE EVALUATION
Patient: Ashley Chu    Procedure Summary       Date: 12/24/24 Room / Location: [facility] OR 07 / Virtual [facility] OR    Anesthesia Start: 0758 Anesthesia Stop: 0819    Procedure: RIGHT ENDOSCOPIC CARPAL TUNNEL RELEASE (mac/local) (Right: Wrist) Diagnosis:       Carpal tunnel syndrome on right      (Carpal tunnel syndrome on right [G56.01])    Surgeons: Arturo Cuello DO Responsible Provider: INGRID Lao    Anesthesia Type: MAC ASA Status: 2            Anesthesia Type: MAC    Vitals Value Taken Time   /84 12/24/24 0847   Temp 36.9 °C (98.5 °F) 12/24/24 0847   Pulse 90 12/24/24 0847   Resp 16 12/24/24 0847   SpO2 98 % 12/24/24 0847       Anesthesia Post Evaluation    Patient location during evaluation: PACU  Patient participation: complete - patient participated  Level of consciousness: awake and alert  Pain score: 0  Pain management: adequate  Airway patency: patent  Cardiovascular status: hemodynamically stable  Respiratory status: room air  Hydration status: acceptable  Postoperative Nausea and Vomiting: none    There were no known notable events for this encounter.

## 2024-12-24 NOTE — INTERVAL H&P NOTE
H&P reviewed. The patient was examined and there are no changes to the H&P.    Patient agreed to the procedure    Surgery discussion: I discussed the diagnosis and treatment options with the patient today along with their associated risks and benefits. After thorough discussion, the patient has elected to proceed with surgical intervention. The patient understands the risks of,including, but not limited to, bleeding, infection, loss of life or limb, pain, permanent numbness, tingling, weakness, loss of motion, failure of the goals of surgery or the potential need for additional surgery. The patient would like to accept these risks and proceed. All questions were answered to the patients satisfaction and the patient seems satisfied with the plan.      Patient was administered anesthesia before we could obtain a signature but agreed, and circulating nurse witnessed and agreed.

## 2024-12-24 NOTE — OP NOTE
ORTHOPEDIC OPERATIVE NOTE    Name: Ashley Chu  : 1985  Surgeon: Ren Cuello DO  Facility: [facility]  Date of Surgery: 24  ORTHOPEDIC OPERATIVE NOTE    SURGEON:     Ren Cuello DO  PRE OP DIAGNOSIS:  Carpal Tunnel Syndrome right Wrist  POST OP DIAGNOSIS:  Same  PROCEDURE:   Endoscopic Carpal Tunnel Release right Wrist    ANESTHESIA:  Local with sedation  ASSISTANT:    SA Arce  COMPLICATIONS:   None.  CONDITION:    Satisfactory to PACU.  BLOOD LOSS: Minimal    INDICATION FOR PROCEDURE: The patient is a 39 y.o. -year-old female who has failed nonsurgical management for right carpal tunnel syndrome including night splints. They had an extensive workup consisting of signs, symptoms, and clinical history of EMG nerve conduction study consistent with right carpal syndrome.  The patient was seen in the office, fully informed risks and benefits of the procedure, and elected to undergo the procedure.    PROCEDURE IN DETAIL: The patient was seen and consented preoperatively with side and site of surgery appropriately marked. The patient was taken back to the operative suite, placed supine on the operative table, and placed on monitor for the duration of case. The patient was administered sedation and monitored throughout the entire surgery by Department of Anesthesia. While sedated, the patient was administered 5 cc of mixed marcaine and lidocaine to the volar aspect of wrist and palm for local anesthesia. The operative upper extremity was then sterilely prepped and draped in sterile orthopedic fashion, elevated with an Esmarch, and tourniquet inflated to 250 mmHg for the duration of case, and time-out was performed confirming site of surgery and surgery to be performed.    A 1-cm transverse incision was made to the ulnar aspect of the palmaris longus at proximal wrist crease. Skin and underlying tissues were sharply dissected down. Hemostasis maintained with bipolar electrocauterization.  Distally based flap of the fascia overlying the median nerve was then released, and under direct visualization, proximal fascia was released with Littler scissors. The elevator and dilator were then placed in the carpal tunnel with appropriate ease of passage and corrugated feel of the undersurface of transcarpal ligament. The endoscope was then placed under direct visualization. The transverse carpal ligament was released in its entirety, confirmed both visually as well as by utilizing endoscope as a probe, which freely passed following release. The nerve was evaluated. No evidence of lesion or adhesion was present.    The wound was irrigated with sterile saline, closed with 5-0 nylon suture. Sterile dressing was then placed of Xeroform and 4x4s affixed with Kerlix and Ace wrap. Tourniquet was deflated, and the patient was taken to PACU in satisfactory condition.    Electronically signed  Ren Cuello DO

## 2024-12-24 NOTE — ANESTHESIA PREPROCEDURE EVALUATION
Patient: Ashley Chu    Procedure Information       Date/Time: 12/24/24 0745    Procedure: RIGHT ENDOSCOPIC CARPAL TUNNEL RELEASE (mac/local) (Right: Wrist) - LOCAL/MAC - 15 MINS    Location: [facility] OR 07 / Virtual [facility] OR    Surgeons: Arturo Cuello, DO            Relevant Problems   Anesthesia (within normal limits)      Pulmonary   (+) Asthma      Neuro   (+) Carpal tunnel syndrome on right      GI   (+) Gastroesophageal reflux disease      Endocrine   (+) Type 2 diabetes mellitus, with long-term current use of insulin      Musculoskeletal   (+) Carpal tunnel syndrome on right       Clinical information reviewed:   Tobacco  Allergies  Meds  Problems  Med Hx  Surg Hx   Fam Hx  Soc   Hx        NPO Detail:  No data recorded     Physical Exam    Airway  Mallampati: II  TM distance: >3 FB  Neck ROM: full     Cardiovascular   Rhythm: regular  Rate: normal     Dental    Pulmonary - normal exam     Abdominal            Anesthesia Plan    History of general anesthesia?: yes  History of complications of general anesthesia?: no    ASA 2     general     The patient is not a current smoker.  Patient was previously instructed to abstain from smoking on day of procedure.  Patient smoked on day of procedure.    intravenous induction   Anesthetic plan and risks discussed with patient.  Use of blood products discussed with patient who consented to blood products.    Plan discussed with CRNA.

## 2025-01-07 ENCOUNTER — OFFICE VISIT (OUTPATIENT)
Dept: ORTHOPEDIC SURGERY | Facility: HOSPITAL | Age: 40
End: 2025-01-07
Payer: COMMERCIAL

## 2025-01-07 VITALS — WEIGHT: 183 LBS | BODY MASS INDEX: 30.49 KG/M2 | HEIGHT: 65 IN

## 2025-01-07 DIAGNOSIS — G56.01 CARPAL TUNNEL SYNDROME ON RIGHT: Primary | ICD-10-CM

## 2025-01-07 PROCEDURE — 4010F ACE/ARB THERAPY RXD/TAKEN: CPT

## 2025-01-07 PROCEDURE — 99211 OFF/OP EST MAY X REQ PHY/QHP: CPT

## 2025-01-07 PROCEDURE — 3008F BODY MASS INDEX DOCD: CPT

## 2025-01-07 ASSESSMENT — PAIN - FUNCTIONAL ASSESSMENT: PAIN_FUNCTIONAL_ASSESSMENT: NO/DENIES PAIN

## 2025-01-07 NOTE — PROGRESS NOTES
POST-OPERATIVE FOLLOW UP      ============================  IMPRESSION/PLAN:  ============================  39 y.o. female s/p right endoscopic carpal tunnel release completed on 12/24/2024    PLAN:  -Sutures were removed today, and Steri-Strips were placed.  -Follow-up: At this time the patient may follow-up on an as-needed basis.  They will call the office with any new or worsening concerns.        Ashley Chu presents today for follow up of the above operation. Pain controlled with current treatment plan.     Review of Systems    Constitutional: see HPI, no fever, no chills, not feeling tired, no significant weight gain or weight loss.   Eyes: No vision changes  ENT: no nosebleeds.   Cardiovascular: no chest pain.   Respiratory: no shortness of breath and no cough.   Gastrointestinal: no abdominal pain, no nausea, no vomiting and no diarrhea.   Musculoskeletal: per HPI  Neurological: no headache, no gait disturbances  Psychiatric: no depression and no sleep disturbances.   Endocrine: no muscle weakness and no muscle cramps.   Hematologic/Lymphatic: no swollen glands and no tendency for easy bruising or excessive swelling.     Patient's past medical history, past surgical history, allergies, and medications have been reviewed unless otherwise noted in the chart.    Patient Active Problem List   Diagnosis    Encntr for gyn exam (general) (routine) w abnormal findings    Vaginal discharge    Pelvic pain    Encounter for IUD insertion    Type 2 diabetes mellitus, with long-term current use of insulin    Encounter for IUD removal    Carpal tunnel syndrome on right    Asthma    Gastroesophageal reflux disease       =================================  EXAM  =================================  Constitutional   General appearance: Alert and in no acute distress. Well developed, well nourished.    Eyes   External Eye, Conjunctiva and lids: Normal external exam - extraocular movements intact (EOMI).   Ears, Nose, Mouth,  and Throat   Hearing: Normal.   Neck   Neck: No neck mass was observed. Supple.   Pulmonary   Respiratory effort: No respiratory distress.   Cardiovascular   Examination of extremities: No peripheral edema.   Psychiatric   Judgment and insight: Intact.   Orientation to person, place, and time: Alert and oriented x 3.   Mood and affect: Normal.   Musculoskeletal:   Hand/Wrist Post-Op Exam    Inspection:  no signs of wound dehiscence or infection, no erythema or discharge  Palpation:  compartments are soft, mild tenderness to palpation over transverse carpal ligament  Range of Motion:  F/E 80/80, S/P 90/90 Finger ROM Full extension, full flexion   Stability:  stable   Strength:  5/5 F/E, 5/5 Adduction/Abduction 5/5 Opposition   Skin:  incision site clean, dry and intact, healing without complication  Vascular:  capillary refill <2 seconds distally  Sensation:  intact to light touch distally      Pam Lott PA-C  Physician Assistant  Orthopedic Surgery  [facility]

## 2025-01-20 ENCOUNTER — APPOINTMENT (OUTPATIENT)
Dept: ORTHOPEDIC SURGERY | Facility: HOSPITAL | Age: 40
End: 2025-01-20
Payer: COMMERCIAL

## 2025-01-28 NOTE — H&P
39 y.o. female presents today for follow-up of right hand numbness, tingling, weakness and pain. The patient reports symptoms for months, getting worse.  Pain is controlled.  Reports no previous surgeries, injections or trauma to the area.  Reports pain worse with use, better at rest.  Pain numb and tingly, wakes them from sleep.   Worse driving a car and talking on a phone.    she did have a shot for what appears to be de Quervain's many years ago.  Got EMG.  Night splints helped some but not enough and would like surgery.     Review of Systems     Constitutional: see HPI, no fever, no chills, not feeling tired, no significant weight gain or weight loss.   Eyes: No vision changes  ENT: no nosebleeds.   Cardiovascular: no chest pain.   Respiratory: no shortness of breath and no cough.   Gastrointestinal: no abdominal pain, no nausea, no vomiting and no diarrhea.   Musculoskeletal: per HPI  Neurological: no headache, no gait disturbances  Psychiatric: no depression and no sleep disturbances.   Endocrine: no muscle weakness and no muscle cramps.   Hematologic/Lymphatic: no swollen glands and no tendency for easy bruising or excessive swelling.      Patient's past medical history, past surgical history, allergies, and medications have been reviewed unless otherwise noted in the chart.      Carpal Tunnel Exam  Inspection:  no evidence of infection, no edema, no erythema, no ecchymosis, Palpation:  compartments are soft, no pain with palpation, Range of Motion:  full wrist and finger range of motion, Stability:  no wrist instability detected, Strength:  5/5 APB and intrinsics, Skin:  intact, Vascular:  capillary refill <2 seconds distally, Sensation:  decreased in the median nerve distribution, Test:  positive Tinel's at the Carpal Tunnel, positive Direct Carpal Tunnel Compression Test       Constitutional   General appearance: Alert and in no acute distress. Well developed, well nourished.    Eyes   External Eye,  Conjunctiva and lids: Normal external exam - pupils were equal in size, round, reactive to light (PERRL) with normal accommodation and extraocular movements intact (EOMI).   Ears, Nose, Mouth, and Throat   Hearing: Normal.   Neck   Neck: No neck mass was observed. Supple.   Pulmonary   Respiratory effort: No respiratory distress.   Cardiovascular   Examination of extremities: No peripheral edema.   Psychiatric   Judgment and insight: Intact.   Orientation to person, place, and time: Alert and oriented x 3.       Mood and affect: Normal.       XR - no fractures, no dislocations, or no osseous abnormalities  right wrist     X-rays were personally reviewed by me. Radiology reports were reviewed by me as well, if available at the time.       EMG shows mild carpal tunnel on the right       Right carpal tunnel syndrome  Surgery discussion: I discussed the diagnosis and treatment options with the patient today along with their associated risks and benefits. After thorough discussion, the patient has elected to proceed with surgical intervention. The patient understands the risks of,including, but not limited to, bleeding, infection, loss of life or limb, pain, permanent numbness, tingling, weakness, loss of motion, failure of the goals of surgery or the potential need for additional surgery. The patient would like to accept these risks and proceed. All questions were answered to the patients satisfaction and the patient seems satisfied with the plan.    Plan right endoscopic carpal tunnel release  Follow-up 2 weeks after no x-ray   Patient returned from dental clinic.  Abscess was drained and cleared for discharge.  As per dental team, no antibiotics necessary.

## 2025-04-07 ENCOUNTER — APPOINTMENT (OUTPATIENT)
Dept: ENDOCRINOLOGY | Facility: CLINIC | Age: 40
End: 2025-04-07
Payer: COMMERCIAL

## 2025-06-30 ENCOUNTER — TELEPHONE (OUTPATIENT)
Dept: OBSTETRICS AND GYNECOLOGY | Facility: CLINIC | Age: 40
End: 2025-06-30
Payer: COMMERCIAL

## 2025-06-30 NOTE — TELEPHONE ENCOUNTER
Last visit with Bina 8/22/2024  Patient can be offered first available opening which appears to be on Thursday 7/3 at the 9am slot or 1120 slot. Thanks.

## 2025-07-03 ENCOUNTER — OFFICE VISIT (OUTPATIENT)
Dept: OBSTETRICS AND GYNECOLOGY | Facility: CLINIC | Age: 40
End: 2025-07-03
Payer: COMMERCIAL

## 2025-07-03 VITALS
SYSTOLIC BLOOD PRESSURE: 124 MMHG | BODY MASS INDEX: 28.99 KG/M2 | DIASTOLIC BLOOD PRESSURE: 76 MMHG | WEIGHT: 174 LBS | HEIGHT: 65 IN

## 2025-07-03 DIAGNOSIS — L73.8 INFECTIOUS FOLLICULITIS: ICD-10-CM

## 2025-07-03 DIAGNOSIS — B99.9 INFECTIOUS FOLLICULITIS: ICD-10-CM

## 2025-07-03 DIAGNOSIS — N90.7 LABIAL CYST: Primary | ICD-10-CM

## 2025-07-03 PROCEDURE — 3074F SYST BP LT 130 MM HG: CPT | Performed by: NURSE PRACTITIONER

## 2025-07-03 PROCEDURE — 3078F DIAST BP <80 MM HG: CPT | Performed by: NURSE PRACTITIONER

## 2025-07-03 PROCEDURE — 4010F ACE/ARB THERAPY RXD/TAKEN: CPT | Performed by: NURSE PRACTITIONER

## 2025-07-03 PROCEDURE — 99214 OFFICE O/P EST MOD 30 MIN: CPT | Performed by: NURSE PRACTITIONER

## 2025-07-03 PROCEDURE — 3008F BODY MASS INDEX DOCD: CPT | Performed by: NURSE PRACTITIONER

## 2025-07-03 RX ORDER — CEPHALEXIN 500 MG/1
500 CAPSULE ORAL 2 TIMES DAILY
Qty: 10 CAPSULE | Refills: 0 | Status: SHIPPED | OUTPATIENT
Start: 2025-07-03 | End: 2025-07-08

## 2025-07-03 ASSESSMENT — ENCOUNTER SYMPTOMS
DYSURIA: 0
SORE THROAT: 0
CARDIOVASCULAR NEGATIVE: 1
HEMATURIA: 0
CONSTIPATION: 1
FREQUENCY: 1
FEVER: 0
VOMITING: 0
ABDOMINAL PAIN: 1
ANOREXIA: 1
NAUSEA: 1
FLANK PAIN: 1
PSYCHIATRIC NEGATIVE: 1
RESPIRATORY NEGATIVE: 1
HEADACHES: 1
BACK PAIN: 1
CHILLS: 0
CONSTITUTIONAL NEGATIVE: 1
DIARRHEA: 1

## 2025-07-03 NOTE — PROGRESS NOTES
Subjective   Patient ID: Ashley Chu is a 40 y.o. female who presents for Follow-up (Patient states she noticed a vaginal lump that she noticed 1 week ago that is itchy and painful. ).  40 year old here for complaints of having a bump at the vaginal opening.  She first noticed the area a week ago.  The bump got bigger and red and then had a white head on it.  The area did start to drain and some blood and pus did come out.  The bump has decreased in size over the last few days.  She notes the area is tender and urination does bother it.  She denies any new partners.  The area is slightly itchy.  She denies any treatments at home that have made the area better or worse.     Female  Problem  The patient's primary symptoms include genital itching, genital lesions, a genital odor, pelvic pain and vaginal discharge. The patient's pertinent negatives include no genital rash, missed menses or vaginal bleeding. This is a new problem. The current episode started in the past 7 days. The problem occurs rarely. The problem has been gradually worsening. The pain is moderate. The problem affects the right side. She is not pregnant. Associated symptoms include abdominal pain, anorexia, back pain, constipation, diarrhea, flank pain, frequency, headaches, joint pain, nausea, painful intercourse and urgency. Pertinent negatives include no chills, discolored urine, dysuria, fever, hematuria, joint swelling, rash, sore throat or vomiting. The vaginal discharge was malodorous and white. She has not been passing tissue. The symptoms are aggravated by activity, bowel movements, heavy lifting, intercourse, tactile pressure and urinating. She is sexually active. No, her partner does not have an STD. She uses tubal ligation for contraception. Her menstrual history has been regular.       Review of Systems   Constitutional: Negative.  Negative for chills and fever.   HENT:  Negative for sore throat.    Respiratory: Negative.      Cardiovascular: Negative.    Gastrointestinal:  Positive for abdominal pain, anorexia, constipation, diarrhea and nausea. Negative for vomiting.   Genitourinary:  Positive for flank pain, frequency, pelvic pain, urgency and vaginal discharge. Negative for dysuria, hematuria and missed menses.   Musculoskeletal:  Positive for back pain and joint pain.   Skin: Negative.  Negative for rash.   Neurological:  Positive for headaches.   Psychiatric/Behavioral: Negative.         Objective   Physical Exam  Constitutional:       Appearance: Normal appearance.   Pulmonary:      Effort: Pulmonary effort is normal.   Abdominal:      Hernia: There is no hernia in the right inguinal area.   Genitourinary:     General: Normal vulva.      Exam position: Supine.      Labia:         Right: Lesion present. No rash, tenderness or injury.         Left: No rash, tenderness, lesion or injury.       Urethra: No prolapse, urethral pain, urethral swelling or urethral lesion.      Vagina: Normal.      Cervix: Normal.      Uterus: Normal.       Adnexa: Right adnexa normal and left adnexa normal.      Rectum: Normal.       Lymphadenopathy:      Lower Body: No right inguinal adenopathy.   Skin:     General: Skin is warm and dry.   Neurological:      Mental Status: She is alert.   Psychiatric:         Mood and Affect: Mood normal.         Behavior: Behavior normal.         Thought Content: Thought content normal.         Judgment: Judgment normal.         Assessment/Plan   Problem List Items Addressed This Visit    None  Visit Diagnoses         Codes      Labial cyst    -  Primary N90.7    Relevant Medications    cephalexin (Keflex) 500 mg capsule      Infectious folliculitis     L73.8, B99.9        Encouraged warm compress to the area  Keflex ordered  Follow up as needed         MOUNA Bennett-CNP 07/03/25 9:33 AM

## 2025-07-06 LAB
BACTERIA SPEC AEROBE CULT: NORMAL
BACTERIA SPEC ANAEROBE CULT: NORMAL

## 2025-07-09 LAB
BACTERIA SPEC AEROBE CULT: NORMAL
BACTERIA SPEC ANAEROBE CULT: NORMAL

## 2025-07-31 ENCOUNTER — TELEPHONE (OUTPATIENT)
Dept: OBSTETRICS AND GYNECOLOGY | Facility: CLINIC | Age: 40
End: 2025-07-31
Payer: COMMERCIAL

## (undated) DEVICE — BLADE, SMARTRELEASE, ONYX, CARPAL TUNNEL, DISP

## (undated) DEVICE — SYRINGE, 10 CC, LUER LOCK

## (undated) DEVICE — GLOVE, PROTEXIS PI CLASSIC, SZ-8.0, PF, PF, LF

## (undated) DEVICE — CUFF, TOURNIQUET, 18 X 4, DUAL PORT/SNGL BLADDER, DISP, LF

## (undated) DEVICE — BANDAGE, ELASTIC, PREMIUM, SELF-CLOSE, 2 IN X 5 YD, STERILE

## (undated) DEVICE — COVER HANDLE LIGHT, STERIS, BLUE, STERILE

## (undated) DEVICE — APPLICATOR, CHLORAPREP, W/ORANGE TINT, 26ML

## (undated) DEVICE — SOLUTION, IRRIGATION, SODIUM CHLORIDE 0.9%, 1000 ML, POUR BOTTLE

## (undated) DEVICE — NEEDLE, HYPODERMIC, REGULAR WALL, REGULAR BEVEL, 22 G X 1.5 IN

## (undated) DEVICE — KIT, UPPER EXTREMITY, CUSTOM, PORTAGE

## (undated) DEVICE — PADDING, CAST, SOFT, 2 X 4YDS

## (undated) DEVICE — DRESSING, GAUZE, PETROLATUM, STRIP, XEROFORM, 1 X 8 IN, STERILE

## (undated) DEVICE — SUTURE, ETHILON, 4-0, BLK, MONO, PS-2 18

## (undated) DEVICE — TOWEL PACK, STERILE, 4/PACK, BLUE